# Patient Record
Sex: MALE | Race: WHITE | NOT HISPANIC OR LATINO | Employment: OTHER | URBAN - METROPOLITAN AREA
[De-identification: names, ages, dates, MRNs, and addresses within clinical notes are randomized per-mention and may not be internally consistent; named-entity substitution may affect disease eponyms.]

---

## 2019-06-18 ENCOUNTER — OFFICE VISIT (OUTPATIENT)
Dept: INTERNAL MEDICINE | Facility: CLINIC | Age: 69
End: 2019-06-18
Payer: MEDICARE

## 2019-06-18 ENCOUNTER — OFFICE VISIT (OUTPATIENT)
Dept: NEUROLOGY | Facility: CLINIC | Age: 69
End: 2019-06-18
Payer: MEDICARE

## 2019-06-18 VITALS
HEART RATE: 70 BPM | TEMPERATURE: 99 F | WEIGHT: 212.5 LBS | SYSTOLIC BLOOD PRESSURE: 130 MMHG | DIASTOLIC BLOOD PRESSURE: 86 MMHG | HEIGHT: 73 IN | OXYGEN SATURATION: 97 % | BODY MASS INDEX: 28.16 KG/M2

## 2019-06-18 VITALS
DIASTOLIC BLOOD PRESSURE: 78 MMHG | HEART RATE: 72 BPM | HEIGHT: 73 IN | SYSTOLIC BLOOD PRESSURE: 135 MMHG | WEIGHT: 215 LBS | BODY MASS INDEX: 28.49 KG/M2

## 2019-06-18 DIAGNOSIS — G62.9 PERIPHERAL POLYNEUROPATHY: Chronic | ICD-10-CM

## 2019-06-18 DIAGNOSIS — N40.0 BENIGN PROSTATIC HYPERPLASIA, UNSPECIFIED WHETHER LOWER URINARY TRACT SYMPTOMS PRESENT: ICD-10-CM

## 2019-06-18 DIAGNOSIS — Z23 NEED FOR TETANUS BOOSTER: ICD-10-CM

## 2019-06-18 DIAGNOSIS — R73.03 PREDIABETES: ICD-10-CM

## 2019-06-18 DIAGNOSIS — M79.7 FIBROMYALGIA: ICD-10-CM

## 2019-06-18 DIAGNOSIS — K21.9 GASTROESOPHAGEAL REFLUX DISEASE, ESOPHAGITIS PRESENCE NOT SPECIFIED: ICD-10-CM

## 2019-06-18 DIAGNOSIS — F32.A ANXIETY AND DEPRESSION: ICD-10-CM

## 2019-06-18 DIAGNOSIS — F03.90 DEMENTIA WITHOUT BEHAVIORAL DISTURBANCE, UNSPECIFIED DEMENTIA TYPE: ICD-10-CM

## 2019-06-18 DIAGNOSIS — F02.80 DEMENTIA IN ALZHEIMER'S DISEASE: ICD-10-CM

## 2019-06-18 DIAGNOSIS — R19.7 DIARRHEA, UNSPECIFIED TYPE: ICD-10-CM

## 2019-06-18 DIAGNOSIS — I10 ESSENTIAL HYPERTENSION: ICD-10-CM

## 2019-06-18 DIAGNOSIS — E78.5 HYPERLIPIDEMIA, UNSPECIFIED HYPERLIPIDEMIA TYPE: ICD-10-CM

## 2019-06-18 DIAGNOSIS — R41.3 MEMORY LOSS: ICD-10-CM

## 2019-06-18 DIAGNOSIS — G30.9 DEMENTIA IN ALZHEIMER'S DISEASE: ICD-10-CM

## 2019-06-18 DIAGNOSIS — R26.9 GAIT DIFFICULTY: ICD-10-CM

## 2019-06-18 DIAGNOSIS — Z95.2 H/O MITRAL VALVE REPLACEMENT WITH MECHANICAL VALVE: ICD-10-CM

## 2019-06-18 DIAGNOSIS — F41.9 ANXIETY AND DEPRESSION: ICD-10-CM

## 2019-06-18 DIAGNOSIS — E34.9 TESTOSTERONE DEFICIENCY: ICD-10-CM

## 2019-06-18 DIAGNOSIS — G62.9 NEUROPATHY: Primary | ICD-10-CM

## 2019-06-18 DIAGNOSIS — R53.81 OTHER MALAISE: ICD-10-CM

## 2019-06-18 DIAGNOSIS — R41.3 MEMORY DIFFICULTIES: ICD-10-CM

## 2019-06-18 PROCEDURE — 99204 OFFICE O/P NEW MOD 45 MIN: CPT | Mod: S$PBB,ICN,CMP, | Performed by: FAMILY MEDICINE

## 2019-06-18 PROCEDURE — 99215 OFFICE O/P EST HI 40 MIN: CPT | Mod: PBBFAC,27 | Performed by: STUDENT IN AN ORGANIZED HEALTH CARE EDUCATION/TRAINING PROGRAM

## 2019-06-18 PROCEDURE — 99999 PR PBB SHADOW E&M-EST. PATIENT-LVL V: CPT | Mod: PBBFAC,GC,, | Performed by: STUDENT IN AN ORGANIZED HEALTH CARE EDUCATION/TRAINING PROGRAM

## 2019-06-18 PROCEDURE — 99204 PR OFFICE/OUTPT VISIT, NEW, LEVL IV, 45-59 MIN: ICD-10-PCS | Mod: S$PBB,ICN,CMP, | Performed by: FAMILY MEDICINE

## 2019-06-18 PROCEDURE — 99204 OFFICE O/P NEW MOD 45 MIN: CPT | Mod: S$PBB,GC,, | Performed by: STUDENT IN AN ORGANIZED HEALTH CARE EDUCATION/TRAINING PROGRAM

## 2019-06-18 PROCEDURE — 99204 PR OFFICE/OUTPT VISIT, NEW, LEVL IV, 45-59 MIN: ICD-10-PCS | Mod: S$PBB,GC,, | Performed by: STUDENT IN AN ORGANIZED HEALTH CARE EDUCATION/TRAINING PROGRAM

## 2019-06-18 PROCEDURE — 99999 PR PBB SHADOW E&M-NEW PATIENT-LVL III: CPT | Mod: PBBFAC,,, | Performed by: FAMILY MEDICINE

## 2019-06-18 PROCEDURE — 99999 PR PBB SHADOW E&M-NEW PATIENT-LVL III: ICD-10-PCS | Mod: PBBFAC,,, | Performed by: FAMILY MEDICINE

## 2019-06-18 PROCEDURE — 99999 PR PBB SHADOW E&M-EST. PATIENT-LVL V: ICD-10-PCS | Mod: PBBFAC,GC,, | Performed by: STUDENT IN AN ORGANIZED HEALTH CARE EDUCATION/TRAINING PROGRAM

## 2019-06-18 PROCEDURE — 99203 OFFICE O/P NEW LOW 30 MIN: CPT | Mod: PBBFAC | Performed by: FAMILY MEDICINE

## 2019-06-18 RX ORDER — LEVOCETIRIZINE DIHYDROCHLORIDE 5 MG/1
1 TABLET, FILM COATED ORAL
COMMUNITY
Start: 2018-11-10

## 2019-06-18 RX ORDER — AMOXICILLIN 500 MG/1
CAPSULE ORAL
COMMUNITY
Start: 2019-05-10 | End: 2019-06-18

## 2019-06-18 RX ORDER — BUSPIRONE HYDROCHLORIDE 10 MG/1
10 TABLET ORAL 2 TIMES DAILY
Refills: 1 | COMMUNITY
Start: 2019-05-23 | End: 2019-08-21 | Stop reason: SDUPTHER

## 2019-06-18 RX ORDER — OXYCODONE AND ACETAMINOPHEN 7.5; 325 MG/1; MG/1
325 TABLET ORAL
Refills: 0 | COMMUNITY
Start: 2019-04-05 | End: 2019-06-18

## 2019-06-18 RX ORDER — WARFARIN SODIUM 5 MG/1
5 TABLET ORAL
Refills: 2 | COMMUNITY
Start: 2019-06-07 | End: 2019-10-01 | Stop reason: SDUPTHER

## 2019-06-18 RX ORDER — TIZANIDINE 4 MG/1
4 TABLET ORAL
COMMUNITY
Start: 2019-04-02

## 2019-06-18 RX ORDER — MEMANTINE HYDROCHLORIDE 10 MG/1
10 TABLET ORAL
Refills: 4 | COMMUNITY
Start: 2019-05-03 | End: 2019-09-13

## 2019-06-18 RX ORDER — CLINDAMYCIN HYDROCHLORIDE 300 MG/1
300 CAPSULE ORAL
Refills: 0 | COMMUNITY
Start: 2019-04-05 | End: 2019-06-18

## 2019-06-18 RX ORDER — OMEPRAZOLE 20 MG/1
20 CAPSULE, DELAYED RELEASE ORAL
COMMUNITY
Start: 2016-08-11

## 2019-06-18 RX ORDER — GALANTAMINE HYDROBROMIDE 8 MG/1
8 CAPSULE, EXTENDED RELEASE ORAL
Refills: 0 | COMMUNITY
Start: 2019-05-03 | End: 2019-06-18

## 2019-06-18 RX ORDER — LOSARTAN POTASSIUM AND HYDROCHLOROTHIAZIDE 25; 100 MG/1; MG/1
1 TABLET ORAL DAILY
Refills: 3 | COMMUNITY
Start: 2019-05-23 | End: 2019-08-21 | Stop reason: SDUPTHER

## 2019-06-18 RX ORDER — TRIAMCINOLONE ACETONIDE 1 MG/G
OINTMENT TOPICAL
COMMUNITY
End: 2020-08-14

## 2019-06-18 RX ORDER — EZETIMIBE 10 MG/1
10 TABLET ORAL
Refills: 3 | COMMUNITY
Start: 2019-04-01 | End: 2019-12-02 | Stop reason: SDUPTHER

## 2019-06-18 RX ORDER — GABAPENTIN 600 MG/1
600 TABLET ORAL 3 TIMES DAILY PRN
Refills: 0 | COMMUNITY
Start: 2019-03-25 | End: 2019-09-13

## 2019-06-18 RX ORDER — PREDNISONE 50 MG/1
50 TABLET ORAL DAILY
Refills: 0 | COMMUNITY
Start: 2019-04-08 | End: 2019-06-18

## 2019-06-18 RX ORDER — HYDROCODONE BITARTRATE AND ACETAMINOPHEN 7.5; 325 MG/1; MG/1
325 TABLET ORAL
Refills: 0 | COMMUNITY
Start: 2019-03-25 | End: 2019-06-18

## 2019-06-18 RX ORDER — KETOCONAZOLE 20 MG/G
CREAM TOPICAL
COMMUNITY
Start: 2016-12-08 | End: 2020-08-14

## 2019-06-18 RX ORDER — TESTOSTERONE CYPIONATE 200 MG/ML
300 INJECTION, SOLUTION INTRAMUSCULAR
COMMUNITY
Start: 2019-06-12 | End: 2019-11-27

## 2019-06-18 RX ORDER — SERTRALINE HYDROCHLORIDE 50 MG/1
75 TABLET, FILM COATED ORAL
COMMUNITY
Start: 2019-02-18 | End: 2019-07-11 | Stop reason: SDUPTHER

## 2019-06-18 RX ORDER — CARVEDILOL 12.5 MG/1
12.5 TABLET ORAL 2 TIMES DAILY WITH MEALS
Refills: 3 | COMMUNITY
Start: 2019-06-07 | End: 2019-12-02 | Stop reason: SDUPTHER

## 2019-06-18 RX ORDER — GALANTAMINE HYDROBROMIDE 16 MG/1
16 CAPSULE, EXTENDED RELEASE ORAL
Refills: 4 | COMMUNITY
Start: 2019-05-03 | End: 2019-09-13

## 2019-06-18 RX ORDER — DAPSONE 25 MG/1
25 TABLET ORAL
COMMUNITY
Start: 2018-09-27 | End: 2019-06-18

## 2019-06-18 RX ORDER — FINASTERIDE 5 MG/1
5 TABLET, FILM COATED ORAL
Refills: 1 | COMMUNITY
Start: 2019-03-31

## 2019-06-18 NOTE — PROGRESS NOTES
"Subjective:      Patient ID: Blane Kim is a 69 y.o. male.    Chief Complaint: Establish Care      HPI:  Blane Kim is a 69 year old male with anxiety/depression, atrial fibrillation, BPH, dementia, hyperlipidemia, hypertension who presents to clinic today to establish care.    Patient accompanied by his wife today.    States he is on multiple medications and feels like it's "overpowering" him.  Has concerns related to this.    Wife states patient was diagnosed with fibromyalgia about 1 year ago and is concerned if this a true diagnosis or if some of his symptoms are related to medication side effects.  Prescribed gabapentin 600 mg, states he takes 4.5 tablets per day.  States the gabapentin does not help with his pain.  Endorses pain to multiple spots over his body including his shoulders, his shoulder blades, and bilateral upper and lower extremities.  Also prescribed tizanidine 4 mg by mouth nightly as needed.    Has been diagnosed by a neurologist with the "beginning stages" of Alzheimers by a neurologist in [place] several months ago.  Denies family history of this.  Has appointment with neurologist here for 2nd opinion.  Has showed some decreased in cognitive thinking skills on evaluation in [place].  Often forgets where he puts his drinks/keys/wallet.  Has many guns in the home--located in locked gun cabinet, afraid he will forget something related to his guns or his keys.  Has had some moments when driving that he may forget directions/get lost and doesn't know where he is.  Wife does the cooking in the home.      Endorses history of chronic loose stools/diarrhea for approximately 2 years.  States this has happened recently.  Was taking Aricept at that time and has since discontinued this.  Denies associated fevers.  Denies associated melena or hematochezia.  States the last episode of diarrhea was yesterday throughout the day.  Has not tried anything.  Has Lomotil at home.  " "    Anxiety/depression:  Prescribed buspirone 10 mg by mouth twice daily and sertraline 50 mg by mouth daily.  Symptoms stable.  Endorses "bad" anxiety especially when his wife is driving or driving in the rain.  In behavioral therapy which has helped.  Prescribed by patient's psychiatrist.    BPH:  Prescribed finasteride 5 mg by mouth daily.  States he was just recently taken off of this to see how his symptoms respond.  Followed by urology.    Dementia:  Prescribed galantamine 16 mg by mouth once daily and memantine 10 mg by mouth daily.    GERD:  Prescribed omeprazole 20 mg by mouth daily.  Symptoms adequately controlled with Rx.    HLD:  Prescribed Zetia 10 mg by mouth daily.    HTN:  Prescribed losartan-HCTZ 100-25 mg by mouth daily and carvedilol 12.5 mg by mouth twice daily.  Blood pressure elevated today.  Just bought a new blood pressure monitor.  Blood pressure elevated on initial check per medical assistant today.  Wife states his blood pressure is typically high.    Mechanical mitral valve:  Prescribed warfarin 5 mg by mouth (10 mg Tues/Thurs/Sat, 7.5 mg every other day) and carvedilol 12.5 mg by mouth twice daily.  Follows with coumadin clinic in [place].    Testosterone deficiency:  Prescribed testosterone cypionate 200 mg/mL 300 mg into muscle.  Through his urologist.    Health Care Maintenance:  Last tetanus booster:  States he has had this over 10 years ago.  Pneumococcal vaccination:  States he had one of these 6-7 years ago.  Shingles vaccination:  Had Zostavax in the past, deferred today.  Last routine labs:  States he did have labs done recently through PCP in [facility]  Last colonoscopy:  States he is due for this in about 4-5 years      Past Medical History:   Diagnosis Date    Anxiety and depression     BPH (benign prostatic hyperplasia)     Dementia     GERD (gastroesophageal reflux disease)     H/O mitral valve replacement     Hyperlipidemia     Hypertension     Testosterone " deficiency        History reviewed. No pertinent surgical history.    History reviewed. No pertinent family history.    Social History     Socioeconomic History    Marital status:      Spouse name: Not on file    Number of children: Not on file    Years of education: Not on file    Highest education level: Not on file   Occupational History    Not on file   Social Needs    Financial resource strain: Not on file    Food insecurity:     Worry: Not on file     Inability: Not on file    Transportation needs:     Medical: Not on file     Non-medical: Not on file   Tobacco Use    Smoking status: Former Smoker    Smokeless tobacco: Former User   Substance and Sexual Activity    Alcohol use: Not on file    Drug use: Not on file    Sexual activity: Not on file   Lifestyle    Physical activity:     Days per week: Not on file     Minutes per session: Not on file    Stress: Not on file   Relationships    Social connections:     Talks on phone: Not on file     Gets together: Not on file     Attends Sikhism service: Not on file     Active member of club or organization: Not on file     Attends meetings of clubs or organizations: Not on file     Relationship status: Not on file   Other Topics Concern    Not on file   Social History Narrative    Not on file       Review of Systems   Constitutional: Negative for chills, fatigue and fever.   HENT: Negative for congestion, hearing loss, nosebleeds, rhinorrhea, sore throat and trouble swallowing.    Eyes: Negative for pain and visual disturbance.   Respiratory: Negative for cough, shortness of breath and wheezing.    Cardiovascular: Negative for chest pain and palpitations.   Gastrointestinal: Positive for diarrhea. Negative for abdominal distention, abdominal pain, constipation, nausea and vomiting.   Genitourinary: Negative for difficulty urinating, dysuria, frequency, hematuria and urgency.   Musculoskeletal: Positive for arthralgias and myalgias. Negative  "for back pain.   Skin: Negative for color change and rash.   Neurological: Negative for dizziness, syncope, speech difficulty, weakness, numbness and headaches.   Psychiatric/Behavioral: Negative for agitation, behavioral problems and confusion. The patient is nervous/anxious.         + Memory loss     Objective:     Vitals:    06/18/19 1102   BP: 130/86   BP Location: Right arm   Patient Position: Sitting   BP Method: Medium (Automatic)   Pulse: 70   Temp: 98.9 °F (37.2 °C)   TempSrc: Oral   SpO2: 97%   Weight: 96.4 kg (212 lb 8.4 oz)   Height: 6' 1" (1.854 m)       Physical Exam   Constitutional: He appears well-developed and well-nourished. He is cooperative. No distress.   HENT:   Head: Normocephalic and atraumatic.   Right Ear: Hearing and external ear normal.   Left Ear: Hearing and external ear normal.   Nose: Nose normal. No rhinorrhea. No epistaxis.   Mouth/Throat: Oropharynx is clear and moist and mucous membranes are normal. No oral lesions.   Eyes: Pupils are equal, round, and reactive to light. Conjunctivae, EOM and lids are normal. Right eye exhibits no discharge. Left eye exhibits no discharge.   Neck: Trachea normal and normal range of motion. Neck supple. No tracheal deviation present.   Cardiovascular: Normal rate, regular rhythm and normal heart sounds. Exam reveals no gallop and no friction rub.   No murmur heard.  Pulmonary/Chest: Effort normal and breath sounds normal. No respiratory distress. He has no wheezes. He has no rales.   Abdominal: Soft. Bowel sounds are normal. He exhibits no distension. There is no tenderness. There is no rebound and no guarding.   Musculoskeletal: Normal range of motion. He exhibits no edema or deformity.   Neurological: He is alert. No cranial nerve deficit. He exhibits normal muscle tone.   Skin: Skin is warm and dry. No rash noted.   Psychiatric: He has a normal mood and affect. His speech is normal and behavior is normal. Judgment and thought content normal. " Cognition and memory are normal.   Nursing note and vitals reviewed.     Assessment:      1. Anxiety and depression    2. Benign prostatic hyperplasia, unspecified whether lower urinary tract symptoms present    3. Memory loss    4. Dementia without behavioral disturbance, unspecified dementia type    5. Diarrhea, unspecified type    6. Fibromyalgia    7. Gastroesophageal reflux disease, esophagitis presence not specified    8. H/O mitral valve replacement with mechanical valve    9. Hyperlipidemia, unspecified hyperlipidemia type    10. Essential hypertension    11. Need for tetanus booster    12. Testosterone deficiency      Plan:   Blane was seen today for establish care.    Diagnoses and all orders for this visit:    Anxiety and depression        -     Stable; continue current regimen presently and regular follow up with psychiatry; consider Cymbalta, instructed patient to discuss this with his psychiatrist.    Benign prostatic hyperplasia, unspecified whether lower urinary tract symptoms present        -     Stable, continue current regimen and regular follow up with urology.    Memory loss; Dementia without behavioral disturbance, unspecified dementia type        -     Keep appointment with [facility] later today for 2nd opinion; consider neuropsychiatric testing; decrease gabapentin to 2-3 times daily instead of 4.5 tablets per day    Diarrhea, unspecified type        -     Recommended OTC probiotics, OTC Imodium PRN, adequate fluid intake with electrolytes; if no improvement with OTC medications can try Lomotil he has at home; return to clinic if no improvement    Fibromyalgia        -     Instructed patient to discuss Cymbalta with his psychiatrist    Gastroesophageal reflux disease, esophagitis presence not specified        -     Stable, continue current regimen.    H/O mitral valve replacement with mechanical valve        -     Continue current regimen and regular follow up with coumadin clinic in  [place]    Hyperlipidemia, unspecified hyperlipidemia type        -     Continue current regimen.    Essential hypertension        -    Stable, continue current regimen; recommended low sodium diet and weight loss    Need for tetanus booster        -     Tdap to be administered today    Testosterone deficiency        -     Continue current regimen and regular follow up with urology    To complete a release of records form to obtain copy of vaccination record, labs, and last colonoscopy report from previous PCP today.

## 2019-06-18 NOTE — PATIENT INSTRUCTIONS
1 - MRI Brain    2 - Stop Namenda today.  If you still have diarrhea in two weeks, ok to stop the Razadyne.    3 - Decrease gabapentin to twice a day from four times a day.  If no change, ok to stop gabapentin.   If you have significantly increased pain, ok to restart.    4 - STOP DRIVING    5 - Labs today    We will consider:  - Cymbalta  - Needle exam for your neuropathy  - Creams  At next visit

## 2019-06-19 ENCOUNTER — PATIENT MESSAGE (OUTPATIENT)
Dept: NEUROLOGY | Facility: CLINIC | Age: 69
End: 2019-06-19

## 2019-06-19 PROBLEM — G62.9 PERIPHERAL NEUROPATHY: Chronic | Status: ACTIVE | Noted: 2019-06-19

## 2019-06-19 NOTE — ASSESSMENT & PLAN NOTE
Unclear diagnosis, but defer further w/u / eval at this time, given precedence given to memory and neuropathy

## 2019-06-19 NOTE — ASSESSMENT & PLAN NOTE
Continue current meds at this time.  Eventually, would like to wean current meds and trial Cymbalta (or Elavil) for mood + pain, but defer for now given multiple other medication changes

## 2019-06-19 NOTE — PROGRESS NOTES
"Name: Blane Kim  MRN: [id number]   CSN: [id number]      Date: 2019    Chief Complaint: memory loss, neuropathy, 2nd opinion fibromyalgia    History of Present Illness (HPI) 19:  Mr. Kim is a 70 yo M with HTN, HLD, h/o afib s/p MAZE who presents for evaluation of his memory loss, neuropathy, and "fibromyalgia."  His wife is most concerned about his memory.  He reports having had difficulty with his memory for about a year, which he describes as losing his coffee mug and not finding it until the next day, forgetting the names of people he's been friends with for decades, and driving down the street and wondering when they did all that construction (but really, it's the same as it's always been).  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  His wife does most of the shopping, though he attempts sometimes - though it takes about two hours for him to do a 'quick' shopping .  Severe diarrhea x2 years, since starting memory meds (Aricept, now DCd); currently on Namenda + Galantamine.  Wife does not think that he has had brain imaging before.  It sounds as though he has had neuropsych testing x2, but no results are available at this visit.  Some difficulty with walking, attributed to joint pain/prior trauma (offered surgery for ACL, meniscus, but declined).    Burning pain, numbness, tingling to just proximal to knees bilaterally and BUE to level of shoulders xyears.    Pains in multiple joints.    ROS:  Positive for memory loss, joint pain, neuropathy, diarrhea.  Negative for HA, vision changes, chest pain, palpitations, cough, SOB, n/v, abdo pain.    Past Medical History: The patient  has a past medical history of Anxiety and depression, BPH (benign prostatic hyperplasia), Dementia, GERD (gastroesophageal reflux disease), H/O mitral valve " "replacement, Hyperlipidemia, Hypertension, and Testosterone deficiency.    Social History: The patient  reports that he has quit smoking. He has quit using smokeless tobacco.    Family History: Their family history is not on file.    Allergies: Statins-hmg-coa reductase inhibitors      Meds:   Current Outpatient Medications on File Prior to Visit   Medication Sig Dispense Refill    ketoconazole (NIZORAL) 2 % cream by intratympanic route.      levocetirizine (XYZAL) 5 MG tablet Take 1 tablet by mouth.      omeprazole (PRILOSEC) 20 MG capsule Take 20 mg by mouth.      sertraline (ZOLOFT) 50 MG tablet Take 75 mg by mouth.      testosterone cypionate (DEPOTESTOTERONE CYPIONATE) 200 mg/mL injection Inject 300 mg into the muscle.      tiZANidine (ZANAFLEX) 4 MG tablet Take 4 mg by mouth.      triamcinolone acetonide 0.1% (KENALOG) 0.1 % ointment by intratympanic route.       No current facility-administered medications on file prior to visit.        Exam:  /78   Pulse 72   Ht 6' 1" (1.854 m)   Wt 97.5 kg (215 lb)   BMI 28.37 kg/m²     Constitutional  Well-developed, well-nourished, appears stated age   Respiratory  Normal respiratory effort   Cardiovascular  Shiny red/purple BLE, prominent in ankles/feet. Decreased hair.   Neurological    * Mental status      - Orientation  Oriented to person, place, time, and situation     - Memory   Intact recent and remote     - Attention/concentration  Attentive, vigilant during exam, but easily distracted during conversations; tangential     - Language  Naming & repetition intact, +2-step commands     - Fund of knowledge  Aware of current events     - Executive  Poorly-organized thoughts; repetitive     - Other     * Cranial nerves       - CN II  Visual fields full to confrontation     - CN III, IV, VI  Extraocular movements full, normal pursuits and saccades     - CN V  Sensation V1 - V3 intact     - CN VII  Face strong and symmetric bilaterally     - CN VIII  " Hearing intact bilaterally     - CN IX, X  Palate raises midline and symmetric     - CN XI  SCM and trapezius 5/5 bilaterally     - CN XII  Tongue midline   * Motor  Muscle bulk normal, strength 5/5 throughout   * Sensory   Symmetrical to light touch bilaterally throughout.  Decreased temperature and vibration in glove and stocking distribution   * Coordination  No dysmetria with finger-to-nose or heel-to-shin   * Gait  Antalgic gait   * Deep tendon reflexes  2+ and symmetric throughout     20/30 MOCA on 6/18/19:          Laboratory/Radiological:  - Results:  Lab Visit on 06/18/2019   Component Date Value Ref Range Status    Vitamin B-12 06/18/2019 490  210 - 950 pg/mL Final    TSH 06/18/2019 2.506  0.400 - 4.000 uIU/mL Final    Hemoglobin A1C 06/18/2019 5.1  4.0 - 5.6 % Final    Estimated Avg Glucose 06/18/2019 100  68 - 131 mg/dL Final    RPR 06/18/2019 Non-reactive  Non-reactive Final     No cerebral imaging on file.      Problem List Items Addressed This Visit        Neuro    Peripheral neuropathy (Chronic)    Current Assessment & Plan     Severe  Labs as above, including hep C and SPEP    Consider EMG         Dementia    Overview     MOCA 20/30 on 6/18/2019  Severe diarrhea with memory medications         Current Assessment & Plan     - Labs today: vitamins B1, B2, B6, B12; TSH, A1c, RPR.  Given concomitant severe neuropathic pain, add hep C and SPEP (r/o MM)    - MRI Brain    - STOP DRIVING    - Given severe diarrhea, stop memory medications:  Stop Namenda today.  If continued severe diarrhea, stop Galantamine in 2 weeks.    If diarrhea persists, consider alternate causes    - Consider neuropsych            Psychiatric    Anxiety and depression    Current Assessment & Plan     Continue current meds at this time.  Eventually, would like to wean current meds and trial Cymbalta (or Elavil) for mood + pain, but defer for now given multiple other medication changes            Orthopedic    Fibromyalgia     Current Assessment & Plan     Unclear diagnosis, but defer further w/u / eval at this time, given precedence given to memory and neuropathy           Other Visit Diagnoses     Neuropathy    -  Primary    Relevant Orders    VITAMIN B1    VITAMIN B2    VITAMIN B6    VITAMIN B12 (Completed)    TSH (Completed)    Hemoglobin A1c (Completed)    RPR (Completed)    Ambulatory consult to Physical Therapy    HEPATITIS PANEL, ACUTE    PROTEIN ELECTROPHORESIS, SERUM    Memory difficulties        Relevant Orders    VITAMIN B1    VITAMIN B2    VITAMIN B6    VITAMIN B12 (Completed)    TSH (Completed)    Hemoglobin A1c (Completed)    RPR (Completed)    Prediabetes         Relevant Orders    Hemoglobin A1c (Completed)    Dementia in Alzheimer's disease        Relevant Orders    MRI Brain Without Contrast    Gait difficulty        Relevant Orders    Ambulatory consult to Physical Therapy    Other malaise         Relevant Orders    HEPATITIS PANEL, ACUTE        RTC 3 mo in movement clinic    Marian Gant MD  [facility]  PGY-4

## 2019-06-19 NOTE — ASSESSMENT & PLAN NOTE
- Labs today: vitamins B1, B2, B6, B12; TSH, A1c, RPR.  Given concomitant severe neuropathic pain, add hep C and SPEP (r/o MM)    - MRI Brain    - STOP DRIVING    - Given severe diarrhea, stop memory medications:  Stop Namenda today.  If continued severe diarrhea, stop Galantamine in 2 weeks.    If diarrhea persists, consider alternate causes    - Consider neuropsych

## 2019-06-20 NOTE — PROGRESS NOTES
I have seen the patient, reviewed the Resident's history and physical, assessment and plan. I have personally interviewed and examined the patient at bedside and agree with the findings.       MD Landry Ozuna - Neurology

## 2019-07-09 ENCOUNTER — HOSPITAL ENCOUNTER (OUTPATIENT)
Dept: RADIOLOGY | Facility: HOSPITAL | Age: 69
Discharge: HOME | End: 2019-07-09
Attending: STUDENT IN AN ORGANIZED HEALTH CARE EDUCATION/TRAINING PROGRAM
Payer: MEDICARE

## 2019-07-09 DIAGNOSIS — F02.80 DEMENTIA IN ALZHEIMER'S DISEASE: ICD-10-CM

## 2019-07-09 DIAGNOSIS — G30.9 DEMENTIA IN ALZHEIMER'S DISEASE: ICD-10-CM

## 2019-07-09 PROCEDURE — 70551 MRI BRAIN STEM W/O DYE: CPT | Mod: 26,,, | Performed by: RADIOLOGY

## 2019-07-09 PROCEDURE — 70551 MRI BRAIN STEM W/O DYE: CPT | Mod: TC

## 2019-07-09 PROCEDURE — 70551 MRI BRAIN WITHOUT CONTRAST: ICD-10-PCS | Mod: 26,,, | Performed by: RADIOLOGY

## 2019-07-10 ENCOUNTER — PATIENT MESSAGE (OUTPATIENT)
Dept: INTERNAL MEDICINE | Facility: CLINIC | Age: 69
End: 2019-07-10

## 2019-07-11 RX ORDER — SERTRALINE HYDROCHLORIDE 50 MG/1
75 TABLET, FILM COATED ORAL DAILY
Qty: 45 TABLET | Refills: 11 | Status: CANCELLED | OUTPATIENT
Start: 2019-07-11 | End: 2020-07-10

## 2019-07-11 RX ORDER — SERTRALINE HYDROCHLORIDE 50 MG/1
75 TABLET, FILM COATED ORAL DAILY
Qty: 45 TABLET | Refills: 11 | Status: SHIPPED | OUTPATIENT
Start: 2019-07-11 | End: 2019-09-13

## 2019-08-20 ENCOUNTER — PATIENT MESSAGE (OUTPATIENT)
Dept: INTERNAL MEDICINE | Facility: CLINIC | Age: 69
End: 2019-08-20

## 2019-08-21 RX ORDER — LOSARTAN POTASSIUM AND HYDROCHLOROTHIAZIDE 25; 100 MG/1; MG/1
1 TABLET ORAL DAILY
Qty: 90 TABLET | Refills: 3 | Status: SHIPPED | OUTPATIENT
Start: 2019-08-21 | End: 2020-01-20 | Stop reason: DRUGHIGH

## 2019-08-21 RX ORDER — BUSPIRONE HYDROCHLORIDE 10 MG/1
10 TABLET ORAL 2 TIMES DAILY
Qty: 180 TABLET | Refills: 3 | Status: SHIPPED | OUTPATIENT
Start: 2019-08-21 | End: 2020-07-16

## 2019-08-27 ENCOUNTER — OFFICE VISIT (OUTPATIENT)
Dept: INTERNAL MEDICINE | Facility: CLINIC | Age: 69
End: 2019-08-27
Payer: MEDICARE

## 2019-08-27 VITALS
BODY MASS INDEX: 27.53 KG/M2 | OXYGEN SATURATION: 98 % | TEMPERATURE: 98 F | HEIGHT: 73 IN | DIASTOLIC BLOOD PRESSURE: 86 MMHG | HEART RATE: 60 BPM | WEIGHT: 207.69 LBS | SYSTOLIC BLOOD PRESSURE: 156 MMHG

## 2019-08-27 DIAGNOSIS — I10 ESSENTIAL HYPERTENSION: ICD-10-CM

## 2019-08-27 DIAGNOSIS — K52.9 CHRONIC DIARRHEA: ICD-10-CM

## 2019-08-27 DIAGNOSIS — F03.90 DEMENTIA WITHOUT BEHAVIORAL DISTURBANCE, UNSPECIFIED DEMENTIA TYPE: ICD-10-CM

## 2019-08-27 DIAGNOSIS — B37.2 CANDIDAL INTERTRIGO: ICD-10-CM

## 2019-08-27 DIAGNOSIS — R31.9 HEMATURIA, UNSPECIFIED TYPE: ICD-10-CM

## 2019-08-27 PROCEDURE — 99999 PR PBB SHADOW E&M-EST. PATIENT-LVL V: CPT | Mod: PBBFAC,,, | Performed by: FAMILY MEDICINE

## 2019-08-27 PROCEDURE — 99213 OFFICE O/P EST LOW 20 MIN: CPT | Mod: S$PBB,,, | Performed by: FAMILY MEDICINE

## 2019-08-27 PROCEDURE — 99213 PR OFFICE/OUTPT VISIT, EST, LEVL III, 20-29 MIN: ICD-10-PCS | Mod: S$PBB,,, | Performed by: FAMILY MEDICINE

## 2019-08-27 PROCEDURE — 99215 OFFICE O/P EST HI 40 MIN: CPT | Mod: PBBFAC | Performed by: FAMILY MEDICINE

## 2019-08-27 PROCEDURE — 99999 PR PBB SHADOW E&M-EST. PATIENT-LVL V: ICD-10-PCS | Mod: PBBFAC,,, | Performed by: FAMILY MEDICINE

## 2019-08-27 RX ORDER — FLUCONAZOLE 150 MG/1
150 TABLET ORAL
Qty: 4 TABLET | Refills: 0 | Status: SHIPPED | OUTPATIENT
Start: 2019-08-27 | End: 2019-09-26

## 2019-08-27 RX ORDER — AMLODIPINE BESYLATE 5 MG/1
5 TABLET ORAL DAILY
Qty: 30 TABLET | Refills: 11 | Status: SHIPPED | OUTPATIENT
Start: 2019-08-27 | End: 2019-09-13 | Stop reason: DRUGHIGH

## 2019-08-27 NOTE — PATIENT INSTRUCTIONS
Controlling High Blood Pressure  High blood pressure (hypertension) is often called the silent killer. This is because many people who have it dont know it. High blood pressure is defined as 140/90 mm Hg or higher. Know your blood pressure and remember to check it regularly. Doing so can save your life. Here are some things you can do to help control your blood pressure.    Choose heart-healthy foods  · Select low-salt, low-fat foods. Limit sodium intake to 2,000 mg per day or the amount suggested by your healthcare provider.  · Limit canned, dried, cured, packaged, and fast foods. These can contain a lot of salt.  · Eat 8 to 10 servings of fruits and vegetables every day.  · Choose lean meats, fish, or chicken.  · Eat whole-grain pasta, brown rice, and beans.  · Eat 2 to 3 servings of low-fat or fat-free dairy products.  · Ask your doctor about the DASH eating plan. This plan helps reduce blood pressure.  · When you go to a restaurant, ask that your meal be prepared with no added salt.  Maintain a healthy weight  · Ask your healthcare provider how many calories to eat a day. Then stick to that number.  · Ask your healthcare provider what weight range is healthiest for you. If you are overweight, a weight loss of only 3% to 5% of your body weight can help lower blood pressure. Generally, a good weight loss goal is to lose 10% of your body weight in a year.  · Limit snacks and sweets.  · Get regular exercise.  Get up and get active  · Choose activities you enjoy. Find ones you can do with friends or family. This includes bicycling, dancing, walking, and jogging.  · Park farther away from building entrances.  · Use stairs instead of the elevator.  · When you can, walk or bike instead of driving.  · Reserve leaves, garden, or do household repairs.  · Be active at a moderate to vigorous level of physical activity for at least 40 minutes for a minimum of 3 to 4 days a week.   Manage stress  · Make time to relax and enjoy  life. Find time to laugh.  · Communicate your concerns with your loved ones and your healthcare provider.  · Visit with family and friends, and keep up with hobbies.  Limit alcohol and quit smoking  · Men should have no more than 2 drinks per day.  · Women should have no more than 1 drink per day.  · Talk with your healthcare provider about quitting smoking. Smoking significantly increases your risk for heart disease and stroke. Ask your healthcare provider about community smoking cessation programs and other options.  Medicines  If lifestyle changes arent enough, your healthcare provider may prescribe high blood pressure medicine. Take all medicines as prescribed. If you have any questions about your medicines, ask your healthcare provider before stopping or changing them.   Date Last Reviewed: 4/27/2016  © 7230-9535 [employer]. [address] [place] [address], [place] [address]. All rights reserved. This information is not intended as a substitute for professional medical care. Always follow your healthcare professional's instructions.

## 2019-08-27 NOTE — PROGRESS NOTES
"Subjective:      Patient ID: Blane Kim is a 69 y.o. male.    Chief Complaint: Dementia (f/u); Follow-up (2 mt ); and Hypertension (f/u)      HPI:  Blane Kim is a 69 year old male with anxiety/depression, atrial fibrillation, BPH, dementia, hyperlipidemia, hypertension who presents to clinic today for follow up on memory loss and hypertension.    HTN:  BP above goal on initial check per medical assistant today.  Prescribed losartan-HCTZ 100-25 mg by mouth daily and carvedilol 12.5 mg by mouth twice daily.    Memory loss:  Has been diagnosed by a neurologist with the "beginning stages" of Alzheimers by a neurologist in [place] several months ago.  Denies family history of this.  Has showed some decreased in cognitive thinking skills on evaluation in [place].  Often forgets where he puts his drinks/keys/wallet.  Has many guns in the home--located in locked gun cabinet, afraid he will forget something related to his guns or his keys.  Has had some moments when driving that he may forget directions/get lost and doesn't know where he is.  Wife does the cooking in the home.  Seen by neurology 6/18/19; MRI ordered at that time, recommended to stop driving, stop memory medications (Namenda) due to diarrhea with recommendations to also stop galantamine in 2 weeks if diarrhea did not improve.  To follow up with neurology 3 months out from initial visit.  States he continues to forget where he places things.  Wife has noticed more confusion in the patient when she is driving him around (asks where she is going or why she is taking certain routes).      Chronic diarrhea:  States the diarrhea has persisted despite stopping his memory medications.  Alternates between diarrhea and soft stools.  Denies presence of mucous in the diarrhea, melena, or hematochezia.  Takes Imodium which sometimes helps, other times not.    States he is allergic to Latex.  States over the past 3-4 months he has had an itchy, red rash " to the inferior aspect of his abdomen.  Has been using rubbing alcohol.  Has tried topical ketoconazole with only mild improvement.    Endorses lack of motivation in the mornings.  Would prefer to just sit and drink coffee and play on his iPad/watch TV.  Gets motivation around noon then will get up and clean up around his shop.    Endorses recurrent hematuria.  Most recently a couple of weeks ago.  Did have a cystoscope per his urologist, reports this was normal.  Endorses history of kidney stone which he has never passed.      Past Medical History:   Diagnosis Date    Anxiety and depression     BPH (benign prostatic hyperplasia)     Dementia     GERD (gastroesophageal reflux disease)     H/O mitral valve replacement     Hyperlipidemia     Hypertension     Peripheral neuropathy 6/19/2019    Testosterone deficiency        History reviewed. No pertinent surgical history.    History reviewed. No pertinent family history.    Social History     Socioeconomic History    Marital status:      Spouse name: Not on file    Number of children: Not on file    Years of education: Not on file    Highest education level: Not on file   Occupational History    Not on file   Social Needs    Financial resource strain: Not hard at all    Food insecurity:     Worry: Never true     Inability: Never true    Transportation needs:     Medical: No     Non-medical: No   Tobacco Use    Smoking status: Former Smoker    Smokeless tobacco: Former User   Substance and Sexual Activity    Alcohol use: Not on file    Drug use: Not on file    Sexual activity: Not on file   Lifestyle    Physical activity:     Days per week: 1 day     Minutes per session: 30 min    Stress: To some extent   Relationships    Social connections:     Talks on phone: More than three times a week     Gets together: Once a week     Attends Voodoo service: Not on file     Active member of club or organization: No     Attends meetings of clubs  "or organizations: Never     Relationship status:    Other Topics Concern    Not on file   Social History Narrative    Not on file       Review of Systems   Constitutional: Negative for activity change, chills, fatigue, fever and unexpected weight change.   HENT: Negative for congestion, hearing loss, nosebleeds, rhinorrhea, sore throat and trouble swallowing.    Eyes: Negative for pain, discharge and visual disturbance.   Respiratory: Negative for cough, chest tightness, shortness of breath and wheezing.    Cardiovascular: Negative for chest pain and palpitations.   Gastrointestinal: Positive for constipation and diarrhea. Negative for abdominal distention, abdominal pain, blood in stool, nausea and vomiting.   Endocrine: Negative for polydipsia and polyuria.   Genitourinary: Positive for hematuria. Negative for difficulty urinating, dysuria, frequency and urgency.   Musculoskeletal: Positive for arthralgias. Negative for back pain, joint swelling, myalgias and neck pain.   Skin: Negative for color change and rash.   Neurological: Positive for headaches. Negative for dizziness, syncope, speech difficulty, weakness and numbness.   Psychiatric/Behavioral: Positive for confusion. Negative for agitation, behavioral problems and dysphoric mood. The patient is not nervous/anxious.      Objective:     Vitals:    08/27/19 1049 08/27/19 1135   BP: (!) 156/90 (!) 156/86   BP Location: Left arm    Patient Position: Sitting    BP Method: Medium (Manual)    Pulse: 60    Temp: 97.9 °F (36.6 °C)    TempSrc: Oral    SpO2: 98%    Weight: 94.2 kg (207 lb 10.8 oz)    Height: 6' 1" (1.854 m)        Physical Exam   Constitutional: He appears well-developed and well-nourished. He is cooperative. No distress.   HENT:   Head: Normocephalic and atraumatic.   Right Ear: Hearing and external ear normal.   Left Ear: Hearing and external ear normal.   Nose: Nose normal. No rhinorrhea. No epistaxis.   Mouth/Throat: Oropharynx is clear " and moist and mucous membranes are normal. No oral lesions.   Eyes: Pupils are equal, round, and reactive to light. Conjunctivae, EOM and lids are normal. Right eye exhibits no discharge. Left eye exhibits no discharge.   Neck: Trachea normal and normal range of motion. Neck supple. No tracheal deviation present.   Cardiovascular: Normal rate, regular rhythm and normal heart sounds. Exam reveals no gallop and no friction rub.   No murmur heard.  Pulmonary/Chest: Effort normal and breath sounds normal. No respiratory distress. He has no wheezes. He has no rales.   Abdominal: Soft. Bowel sounds are normal. He exhibits no distension. There is no tenderness. There is no rebound and no guarding.   Musculoskeletal: Normal range of motion. He exhibits no edema or deformity.   Neurological: He is alert. No cranial nerve deficit. He exhibits normal muscle tone.   Skin: Skin is warm and dry. Rash noted. Rash is macular. There is erythema.   Erythematous rash to inferior aspect of bilateral abdomen and bilateral flanks.   Psychiatric: He has a normal mood and affect. His speech is normal and behavior is normal. Judgment and thought content normal. Cognition and memory are normal.   Nursing note and vitals reviewed.     Assessment:      1. Candidal intertrigo    2. Chronic diarrhea    3. Dementia without behavioral disturbance, unspecified dementia type    4. Hematuria, unspecified type    5. Essential hypertension      Plan:   Blane was seen today for dementia, follow-up and hypertension.    Diagnoses and all orders for this visit:    Candidal intertrigo  -     Start fluconazole (DIFLUCAN) 150 MG Tab; Take 1 tablet (150 mg total) by mouth every 7 days for 4 weeks.  Did not have significant improvement with topical ketoconazole.  Keep the area cool and dry.  To notify me if no improvement in 4 weeks.    Chronic diarrhea  -     Ambulatory Referral to Gastroenterology; continue Imodium PRN    Dementia without behavioral  "disturbance, unspecified dementia type        -     Continue regular follow up with neurology; consider restarting memory medications as had no significant change in his diarrhea    Hematuria, unspecified type        -      Follow up with urology; possible contribution from anticoagulation, endorses history of "embedded" kidney stone; may need follow up for nephrolithiasis    Essential hypertension  -     Above goal.  Add amLODIPine (NORVASC) 5 MG tablet; Take 1 tablet (5 mg total) by mouth once daily.  Counseled patient on potential adverse effects.  Return to clinic in 2 weeks for nursing blood pressure check.  Counseled patient on the importance of a low sodium diet and daily aerobic exercise.       "

## 2019-08-29 ENCOUNTER — TELEPHONE (OUTPATIENT)
Dept: NEUROLOGY | Facility: CLINIC | Age: 69
End: 2019-08-29

## 2019-08-29 NOTE — TELEPHONE ENCOUNTER
----- Message from Elsie Shaw sent at 8/29/2019  9:08 AM CDT -----  Contact: PTs Wife - nA  Wife is calling requesting that the appointment on 09/13 be moved to 9/11 if possible please  PT live far away and trying to make it easier on them.    Callback: [phone] or [phone]   Please leave voicemail if no one answered.

## 2019-09-13 ENCOUNTER — CLINICAL SUPPORT (OUTPATIENT)
Dept: INTERNAL MEDICINE | Facility: CLINIC | Age: 69
End: 2019-09-13
Payer: MEDICARE

## 2019-09-13 ENCOUNTER — TELEPHONE (OUTPATIENT)
Dept: INTERNAL MEDICINE | Facility: CLINIC | Age: 69
End: 2019-09-13

## 2019-09-13 ENCOUNTER — PATIENT MESSAGE (OUTPATIENT)
Dept: ADMINISTRATIVE | Facility: OTHER | Age: 69
End: 2019-09-13

## 2019-09-13 ENCOUNTER — OFFICE VISIT (OUTPATIENT)
Dept: NEUROLOGY | Facility: CLINIC | Age: 69
End: 2019-09-13
Payer: MEDICARE

## 2019-09-13 VITALS
BODY MASS INDEX: 27.55 KG/M2 | SYSTOLIC BLOOD PRESSURE: 139 MMHG | HEIGHT: 73 IN | DIASTOLIC BLOOD PRESSURE: 79 MMHG | WEIGHT: 207.88 LBS | HEART RATE: 70 BPM

## 2019-09-13 DIAGNOSIS — F32.A ANXIETY AND DEPRESSION: ICD-10-CM

## 2019-09-13 DIAGNOSIS — R41.3 MEMORY DIFFICULTY: ICD-10-CM

## 2019-09-13 DIAGNOSIS — G62.9 PERIPHERAL POLYNEUROPATHY: Primary | Chronic | ICD-10-CM

## 2019-09-13 DIAGNOSIS — F41.9 ANXIETY AND DEPRESSION: ICD-10-CM

## 2019-09-13 PROCEDURE — 99214 PR OFFICE/OUTPT VISIT, EST, LEVL IV, 30-39 MIN: ICD-10-PCS | Mod: S$PBB,,, | Performed by: PSYCHIATRY & NEUROLOGY

## 2019-09-13 PROCEDURE — 99999 PR PBB SHADOW E&M-EST. PATIENT-LVL III: ICD-10-PCS | Mod: PBBFAC,,, | Performed by: PSYCHIATRY & NEUROLOGY

## 2019-09-13 PROCEDURE — 99213 OFFICE O/P EST LOW 20 MIN: CPT | Mod: PBBFAC,27 | Performed by: PSYCHIATRY & NEUROLOGY

## 2019-09-13 PROCEDURE — 99999 PR PBB SHADOW E&M-EST. PATIENT-LVL I: ICD-10-PCS | Mod: PBBFAC,,,

## 2019-09-13 PROCEDURE — 99214 OFFICE O/P EST MOD 30 MIN: CPT | Mod: S$PBB,,, | Performed by: PSYCHIATRY & NEUROLOGY

## 2019-09-13 PROCEDURE — 99999 PR PBB SHADOW E&M-EST. PATIENT-LVL I: CPT | Mod: PBBFAC,,,

## 2019-09-13 PROCEDURE — 99999 PR PBB SHADOW E&M-EST. PATIENT-LVL III: CPT | Mod: PBBFAC,,, | Performed by: PSYCHIATRY & NEUROLOGY

## 2019-09-13 PROCEDURE — 99211 OFF/OP EST MAY X REQ PHY/QHP: CPT | Mod: PBBFAC

## 2019-09-13 RX ORDER — WARFARIN SODIUM 5 MG/1
TABLET ORAL
COMMUNITY
Start: 2019-08-29

## 2019-09-13 RX ORDER — AMLODIPINE BESYLATE 10 MG/1
10 TABLET ORAL DAILY
Qty: 90 TABLET | Refills: 3 | Status: SHIPPED | OUTPATIENT
Start: 2019-09-13 | End: 2020-01-06 | Stop reason: SDUPTHER

## 2019-09-13 RX ORDER — DULOXETIN HYDROCHLORIDE 30 MG/1
30 CAPSULE, DELAYED RELEASE ORAL DAILY
Qty: 90 CAPSULE | Refills: 3 | Status: SHIPPED | OUTPATIENT
Start: 2019-09-13 | End: 2019-10-29

## 2019-09-13 NOTE — PROGRESS NOTES
Pt is here for b/p check, 143/90, p 69. Dr Mckay notified, pt would like to start Digital HTN program. Pt was escorted to [facility] to sign up.

## 2019-09-13 NOTE — LETTER
September 13, 2019      Marian Gant MD  [address]  [facility] [address]           [facility]  [address]  [facility] [address]  Phone: [phone]  Fax: [phone]          Patient: Blane Kim   MR Number: [id number]   YOB: 1950   Date of Visit: 9/13/2019       Dear Dr. Marian Gant:    Thank you for referring Blane Kim to me for evaluation. Attached you will find relevant portions of my assessment and plan of care.    If you have questions, please do not hesitate to call me. I look forward to following Blane Kim along with you.    Sincerely,    Izaiah Ansari MD    Enclosure  CC:  No Recipients    If you would like to receive this communication electronically, please contact [email or web address] or [phone] to request more information on [employer] [facility] access.    For providers and/or their staff who would like to refer a patient to [facility], please contact us through our one-stop-shop provider referral line, [facility], at [phone].    If you feel you have received this communication in error or would no longer like to receive these types of communications, please e-mail [email or web address]

## 2019-09-13 NOTE — TELEPHONE ENCOUNTER
BP above goal.  Increase amlodipine to 10 mg by mouth daily.  Start digital HTN program.  Rx sent to patient's pharmacy electronically.

## 2019-09-13 NOTE — PROGRESS NOTES
[facility] - NEUROLOGY  [facility] [place]    Date: 2019   Patient Name: Blane Kim   MRN: [id number]   PCP: Vicente Mckay  Referring Provider: Marian Gant MD    Assessment:      This is Blane Kim, 69 y.o. male with atrial fibrillation, HTN, neuropathy potentially related to B6 deficiency with memory difficulty and MOCA 2019/30.  While MOCA is below normal, he remains fairly independent and able to perform complex task raising reservations about whether this is truly Alzheimer dementia.  He has been unable to tolerate 2 ACEI as well as namenda due to GI upset.     Plan:      -  Patient will have evaluation in memory clinic and return to me when complete  -  Start cymbalta and stop zoloft  -  B6 supplement  -  Continue Buspar, consider weaning on follow up       I discussed side effects of the medications. I asked the patient to  stop the medication if he notices serious adverse effects as we discussed and to seek immediate medical attention at an ER.     Izaiah Ansari MD  [facility]   Department of Neurology    Subjective:     HPI:   Mr. Blane Kim is a 69 y.o. 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  He remains active working in his wood shop and has been able to complete tasks such as repairing power tools.  He was recommended to stop driving on prior visit and has restricted driving to near his home without incident.  He notes some recent difficulty with memory such as not recalling knowing an acquaintance from [employer] after they passes away.    -  Diarrhea improved since coming off galantamine and namenda  -  He reports ongoing difficulty with anxiety but on questioning this seems more like situational frustration with things such as dealing with grandchildren, wife's driving, or  "figuring out how to repair tools.    -  Ongoing burning feeling in feet which is bothersome but does not interfere with activities.    Per Dr. Gant 2019:  "68 yo M with HTN, HLD, h/o afib s/p MAZE who presents for evaluation of his memory loss, neuropathy, and "fibromyalgia."  His wife is most concerned about his memory.  He reports having had difficulty with his memory for about a year, which he describes as losing his coffee mug and not finding it until the next day, forgetting the names of people he's been friends with for decades, and driving down the street and wondering when they did all that construction (but really, it's the same as it's always been).  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  His wife does most of the shopping, though he attempts sometimes - though it takes about two hours for him to do a 'quick' shopping .  Severe diarrhea x2 years, since starting memory meds (Aricept, now DCd); currently on Namenda + Galantamine.  Wife does not think that he has had brain imaging before.  It sounds as though he has had neuropsych testing x2, but no results are available at this visit.  Some difficulty with walking, attributed to joint pain/prior trauma (offered surgery for ACL, meniscus, but declined).     Burning pain, numbness, tingling to just proximal to knees bilaterally and BUE to level of shoulders xyears.     Pains in multiple joints.    Per Neurologist Dr. Nova 2018  Neurobehavioral/cognitive exam (45+ minutes): Digit span 5 numbers forward. No focal inattention on a cancellation test. Able to repeat sentences while simple commands crossed commands although he erred on syntactically complex question. On the [place] Naming Test-3 scored 15/15 correct. On fluency produced 11 exemplars for the letter S and subsequently 13 exemplars " "for the category animals. He could not get into set for motor sequencing. On bimanual crossed response testing greater than 50% errors. Triple loops were relatively accurate although there was a single error on alternating design. Ideomotor praxis 3/3 accurate gestures. On [employer] verbal learning scored 12 on free recall. I recognition he had 9 true positives correct with 1 related false positive error. Miscopied three-dimensional figure. On Sánchez visual organization 1/3 correct. 2 elements of clock drawing were accurate. Overall score the neurobehavioral exam was 17/30, frontal index = 0.41"    PAST MEDICAL HISTORY:  Past Medical History:   Diagnosis Date    Anxiety and depression     BPH (benign prostatic hyperplasia)     Dementia     GERD (gastroesophageal reflux disease)     H/O mitral valve replacement     Hyperlipidemia     Hypertension     Peripheral neuropathy 6/19/2019    Testosterone deficiency        PAST SURGICAL HISTORY:  No past surgical history on file.    CURRENT MEDS:  Current Outpatient Medications   Medication Sig Dispense Refill    amLODIPine (NORVASC) 5 MG tablet Take 1 tablet (5 mg total) by mouth once daily. 30 tablet 11    busPIRone (BUSPAR) 10 MG tablet Take 1 tablet (10 mg total) by mouth 2 (two) times daily. 180 tablet 3    carvedilol (COREG) 12.5 MG tablet Take 12.5 mg by mouth 2 (two) times daily with meals.  3    ezetimibe (ZETIA) 10 mg tablet 10 mg.  3    finasteride (PROSCAR) 5 mg tablet 5 mg.  1    fluconazole (DIFLUCAN) 150 MG Tab Take 1 tablet (150 mg total) by mouth every 7 days. 4 tablet 0    galantamine (RAZADYNE ER) 16 MG 24 hr capsule 16 mg.  4    ketoconazole (NIZORAL) 2 % cream by intratympanic route.      levocetirizine (XYZAL) 5 MG tablet Take 1 tablet by mouth.      losartan-hydrochlorothiazide 100-25 mg (HYZAAR) 100-25 mg per tablet Take 1 tablet by mouth once daily. 90 tablet 3    memantine (NAMENDA) 10 MG Tab 10 mg.  4    omeprazole (PRILOSEC) 20 " "MG capsule Take 20 mg by mouth.      sertraline (ZOLOFT) 50 MG tablet Take 1.5 tablets (75 mg total) by mouth once daily. 45 tablet 11    testosterone cypionate (DEPOTESTOTERONE CYPIONATE) 200 mg/mL injection Inject 300 mg into the muscle.      TESTOSTERONE IM Inject 300 mg into the muscle.      tiZANidine (ZANAFLEX) 4 MG tablet Take 4 mg by mouth.      triamcinolone acetonide 0.1% (KENALOG) 0.1 % ointment by intratympanic route.      warfarin (COUMADIN) 5 MG tablet 5 mg.  2    warfarin (COUMADIN) 5 MG tablet TAKE ONLY AS DIRECTED BY COUMADIN CLINIC.  TAKE 7.5MG DAILY.       No current facility-administered medications for this visit.        ALLERGIES:  Review of patient's allergies indicates:   Allergen Reactions    Statins-hmg-coa reductase inhibitors Other (See Comments)     Muscle pains/myalgias  Specifically to Lipitor.  causes joints aches    Latex Rash     Can the patient chew gum without allergic reation? Yes  Can the patient blow up a balloon without reaction? Yes  Is the patient allergic to kiwi, bananas, avocado, or chestnuts? No  Immediate itch or reaction to latex gloves? No  Known allergy to latex (i.e. blood test)? Yes  Does the patient require special undergarments, such as panties/briefs with special waist band?yes       FAMILY HISTORY:  No family history on file.    SOCIAL HISTORY:  Social History     Tobacco Use    Smoking status: Former Smoker    Smokeless tobacco: Former User   Substance Use Topics    Alcohol use: Not on file    Drug use: Not on file       Review of Systems:  12 review of systems is negative except for the symptoms mentioned in HPI.        Objective:     Vitals:    09/13/19 1311   BP: 139/79   Pulse: 70   Weight: 94.3 kg (207 lb 14.3 oz)   Height: 6' 1" (1.854 m)       General: NAD, well nourished   Eyes: no tearing, discharge, no erythema   ENT: moist mucous membranes of the oral cavity, nares patent    Neck: Supple, full range of motion  Cardiovascular: Warm and " well perfused, pulses equal and symmetrical  Lungs: Normal work of breathing, normal chest wall excursions  Skin: No rash, lesions, or breakdown on exposed skin  Psychiatry: Mood and affect are appropriate   Abdomen: soft, non tender, non distended  Extremeties: No cyanosis, clubbing or edema.    Neurological   MENTAL STATUS: Alert and oriented to person, place, and time. Speech without dysarthria, able to name and repeat without difficulty.   CRANIAL NERVES: Visual fields intact. PERRL. EOMI. Facial sensation intact. Face symmetrical. Hearing grossly intact. Full shoulder shrug bilaterally. Tongue protrudes midline   SENSORY: Sensation is intact to light touch and vibration throughout.  Negative Romberg.   MOTOR: Normal bulk and tone. No pronator drift.  5/5 deltoid, biceps, triceps, interosseous, hand  bilaterally. 5/5 iliopsoas, knee extension/flexion, foot dorsi/plantarflexion bilaterally.    REFLEXES: Symmetric and 2+ throughout. Toes down going bilaterally.   CEREBELLAR/COORDINATION/GAIT: Gait steady with normal arm swing and stride length.  Finger to nose intact. Normal rapid alternating movements.

## 2019-09-17 ENCOUNTER — PATIENT OUTREACH (OUTPATIENT)
Dept: OTHER | Facility: OTHER | Age: 69
End: 2019-09-17

## 2019-09-17 NOTE — PROGRESS NOTES
Digital Medicine Program Enrollment      Our goal is to get BP to consistently below 130/80mmHg and make the process convenient so patient can avoid extra trips to the office. Getting your blood pressure below 130/80mmHg (definition of control) will reduce your risk for heart attack, kidney failure, stroke and death (as well as kidney failure, eye disease, & dementia)      Reviewed that the Digital Medicine care team - consisting of a clinician and a health  - will follow the most current evidence-based national guidelines for treating your condition.  The health  will focus on lifestyle modifications and motivation while the clinician will focus on medication therapy.  The care team will review all data on a regular basis and reach out as needed.      Explained that one of the key parts of the program is communication with the care team.  Asked patient to respond to outreach attempts and complete questionnaires.  Stressed importance of medication adherence.      Explained that we expect patient to obtain several blood pressures per week at random times of day.  Instructed patient not to allow anyone else to use phone and monitoring device.  Confirmed appropriate BP monitoring technique.      Explained to patient that the digital medicine team is not available for emergencies.  Patient will call [facility] on-call ([phone] or [phone]) or [phone] if needed.

## 2019-09-17 NOTE — LETTER
September 17, 2019     Blane Kim  [address] [facility] [place] [address]       Dear Blane,    Welcome to [facility]! Our goal is to make care effective, proactive and convenient by using data you send us from home to better treat your chronic conditions.          My name is Kathleen Alarcon, and I am your dedicated Digital Medicine clinician. As an expert in medication management, I will help ensure that the medications you are taking continue to provide the intended benefits and help you reach your goals. You can reach me directly at [phone] or by sending me a message directly through your [facility] account.      I am Nai Luna and I will be your health . My job is to help you identify lifestyle changes to improve your disease control. We will talk about nutrition, exercise, and other ways you may be able to adjust your current habits to better your health. Additionally, we will help ensure you are completing the tests and screenings that are necessary to help manage your conditions. You can reach me directly at  or by sending me a message directly through your [facility] account.    Most importantly, YOU are at the center of this team. Together, we will work to improve your overall health and encourage you to meet your goals for a healthier lifestyle.     What we expect from YOU:  · Please take frequent home blood pressure measurements. We ask that you take at least 1 blood pressure reading per week, but more information will better help us get you know you. Be sure you rest for a few minutes before taking the reading in a quiet, comfortable place.     Be available to receive phone calls or [facility] messages, when appropriate, from your care team. Please let us know if there are any specific days or times that work best for us to reach you via phone.     Complete routine tests and screenings. Dont worry, we will help keep you on track!           What you should  expect from your Digital Medicine Care Team:   We will work with you to create a personalized plan of care and provide you with encouragement and education, including regarding lifestyle changes, that could help you manage your disease states.     We will adjust your current medications, if needed, and continue to monitor your long-term progress.     We will provide you and your physician with monthly progress reports after you have been in the program for more than 30 days.     We will send you reminders through [facility] and text messages to help ensure you do not miss any testing deadlines to help manage your disease states.    You will be able to reach us by phone or through your [facility] account by clicking our names under Care Team on the right side of the home screen.    I look forward to working with you to achieve your blood pressure goals!    We look forward to working with you to help manage your health,    Sincerely,    Your Digital Medicine Team    Please visit our websites to learn more:   · Hypertension: [email or web address]      Remember, we are not available for emergencies. If you have an emergency, please contact your doctors office directly or call [facility] on-call ([phone] or [phone]) or [phone].

## 2019-09-17 NOTE — LETTER
September 17, 2019     Blane Kim  [address] [facility] [place] [address]       Dear Blane,    Welcome to [facility]! Our goal is to make care effective, proactive and convenient by using data you send us from home to better treat your chronic conditions.          My name is Kathleen Alarcon, and I am your dedicated Digital Medicine clinician. As an expert in medication management, I will help ensure that the medications you are taking continue to provide the intended benefits and help you reach your goals. You can reach me directly at [phone] or by sending me a message directly through your [facility] account.      I am Nai Luna and I will be your health . My job is to help you identify lifestyle changes to improve your disease control. We will talk about nutrition, exercise, and other ways you may be able to adjust your current habits to better your health. Additionally, we will help ensure you are completing the tests and screenings that are necessary to help manage your conditions. You can reach me directly at  or by sending me a message directly through your [facility] account.    Most importantly, YOU are at the center of this team. Together, we will work to improve your overall health and encourage you to meet your goals for a healthier lifestyle.     What we expect from YOU:  · Please take frequent home blood pressure measurements. We ask that you take at least 1 blood pressure reading per week, but more information will better help us get you know you. Be sure you rest for a few minutes before taking the reading in a quiet, comfortable place.     Be available to receive phone calls or [facility] messages, when appropriate, from your care team. Please let us know if there are any specific days or times that work best for us to reach you via phone.     Complete routine tests and screenings. Dont worry, we will help keep you on track!           What you should  expect from your Digital Medicine Care Team:   We will work with you to create a personalized plan of care and provide you with encouragement and education, including regarding lifestyle changes, that could help you manage your disease states.     We will adjust your current medications, if needed, and continue to monitor your long-term progress.     We will provide you and your physician with monthly progress reports after you have been in the program for more than 30 days.     We will send you reminders through [facility] and text messages to help ensure you do not miss any testing deadlines to help manage your disease states.    You will be able to reach us by phone or through your [facility] account by clicking our names under Care Team on the right side of the home screen.    I look forward to working with you to achieve your blood pressure goals!    We look forward to working with you to help manage your health,    Sincerely,    Your Digital Medicine Team    Please visit our websites to learn more:   · Hypertension: [email or web address]      Remember, we are not available for emergencies. If you have an emergency, please contact your doctors office directly or call [facility] on-call ([phone] or [phone]) or [phone].

## 2019-09-23 ENCOUNTER — PATIENT MESSAGE (OUTPATIENT)
Dept: ADMINISTRATIVE | Facility: OTHER | Age: 69
End: 2019-09-23

## 2019-10-01 ENCOUNTER — PATIENT OUTREACH (OUTPATIENT)
Dept: OTHER | Facility: OTHER | Age: 69
End: 2019-10-01

## 2019-10-01 NOTE — PROGRESS NOTES
Digital Medicine: Clinician Introduction    Blane Kim is a 69 y.o. male who is newly enrolled in the Digital Medicine Clinic.    The following information was reviewed and updated:  Preferred pharmacy   [facility] [address] [place] [address] [address] [address]  [address] [address]  Phone: [phone] Fax: [phone]      Patient prefers a 90 days supply.     Review of patient's allergies indicates:   Allergen Reactions    Statins-hmg-coa reductase inhibitors Other (See Comments)     Muscle pains/myalgias  Specifically to Lipitor.  causes joints aches    Latex Rash     Can the patient chew gum without allergic reation? Yes  Can the patient blow up a balloon without reaction? Yes  Is the patient allergic to kiwi, bananas, avocado, or chestnuts? No  Immediate itch or reaction to latex gloves? No  Known allergy to latex (i.e. blood test)? Yes  Does the patient require special undergarments, such as panties/briefs with special waist band?yes       68 yo male with a PMH listed below.    Past Medical History:  No date: Anxiety and depression  No date: BPH (benign prostatic hyperplasia)  No date: Dementia  No date: GERD (gastroesophageal reflux disease)  No date: H/O mitral valve replacement  No date: Hyperlipidemia  No date: Hypertension  6/19/2019: Peripheral neuropathy  No date: Testosterone deficiency    He is having edema with amlodipine 10mg daily since 9/26.    The history is provided by the patient.     HYPERTENSION  Our goal is to get BP to consistently below 130/80mmHg and make the process convenient so patient can avoid extra trips to the office. Getting your blood pressure below 130/80mmHg (definition of control) will reduce your risk for heart attack, kidney failure, stroke and death (as well as kidney failure, eye disease, & dementia)      Reviewed non-pharmacologic therapies and impact on BP      Explained that we expect patient to obtain several blood pressures per  week at random times of day.  Instructed patient not to allow anyone else to use phone and monitoring device.  Confirmed appropriate BP monitoring technique.      Explained to patient that the digital medicine team is not available for emergencies.  Patient will call [facility] on-call ([phone] or [phone]) or 911 if needed.    Patient's BP goal is 130/80. Patients BP average is 130/72 mmHg, which is at or below goal, per 2017 ACC/AHA Hypertension Guidelines.      Med Review complete.    Allergies reviewed.      Last 5 Patient Entered Readings                                      Current 30 Day Average: 130/72     Recent Readings 9/29/2019 9/25/2019 9/23/2019 9/21/2019 9/20/2019    SBP (mmHg) 139 124 131 113 133    DBP (mmHg) 83 72 75 63 73    Pulse 72 75 76 94 74                Sleep Apnea  Patient previously diagnosed with BRAULIO He reports he is not currently using CPAP. He is not using CPAP because: no longer having trouble going to sleep. He lost twenty pounds since this diagnosis. He does not want further work-up for BRAULIO at this time..         Medication Adherence:   He misses doses: never    Patient is not selectively taking diuretics.    He does not wonder if medications are working.  He knows purpose of medications.      Adherence tools used: alarm and cell phone    Patient uses an alarm on his phone.    He takes carvedilol at 7am and 6pm.  He takes amlodipine and losartan-HCTZ in the morning.      INTERVENTION(S)  reviewed appropriate dose schedule    PLAN  patient verbalizes understanding    Recommended buying compression socks to help with edema.    Patient will continue his current HTN medications.    Recommended filling out his on-boarding questionnaires.          Topic    Lipid (Cholesterol) Test        Current Medication Regimen:  Hypertension Medications             amLODIPine (NORVASC) 10 MG tablet Take 1 tablet (10 mg total) by mouth once daily.    carvedilol (COREG) 12.5 MG tablet Take  12.5 mg by mouth 2 (two) times daily with meals.    losartan-hydrochlorothiazide 100-25 mg (HYZAAR) 100-25 mg per tablet Take 1 tablet by mouth once daily.            Reviewed the importance of self-monitoring, medication adherence, and that the health  can be used as a resource for lifestyle modifications to help reduce or maintain a healthy lifestyle.    Sent link to [facility] [employer] webpages and my contact information via [employer] for future questions. Follow up scheduled.

## 2019-10-15 ENCOUNTER — PATIENT OUTREACH (OUTPATIENT)
Dept: OTHER | Facility: OTHER | Age: 69
End: 2019-10-15

## 2019-10-29 ENCOUNTER — TELEPHONE (OUTPATIENT)
Dept: NEUROLOGY | Facility: CLINIC | Age: 69
End: 2019-10-29

## 2019-10-29 RX ORDER — DULOXETIN HYDROCHLORIDE 60 MG/1
60 CAPSULE, DELAYED RELEASE ORAL DAILY
Qty: 90 CAPSULE | Refills: 3 | Status: SHIPPED | OUTPATIENT
Start: 2019-10-29 | End: 2020-12-24 | Stop reason: SDUPTHER

## 2019-10-29 NOTE — TELEPHONE ENCOUNTER
----- Message from Izaiah Ansari MD sent at 10/29/2019  3:07 PM CDT -----  Contact: Blane   Please tell him to increase Cymbalta to 60mg daily, revised prescription has been sent to his pharmacy.    ----- Message -----  From: Adelaide Stafford MA  Sent: 10/29/2019   2:53 PM CDT  To: Izaiah Ansari MD    Patient  Stated that he wants to  Make  Sure he is not over reacting stated that he have been on the medication for over 30 days and does not seem to help would you like him to come in for a visit or try another medication  ----- Message -----  From: Wendy Franco  Sent: 10/29/2019   1:38 PM CDT  To: [facility] Staff    Pt calling to speak to someone because he says when he wakes up from a sleep he can hardly get to moving.  He says he has pain in legs, back and shoulders and he would like to know what can be done.  He has been on the Cymbalta for over 30 days now and it does not appear to be helping.  He can be reached at [phone]

## 2019-11-05 NOTE — PROGRESS NOTES
Digital Medicine: Health  Follow-Up    Patient states that he has been itching and experiencing a rash. This breakout occurs around his waistline, knees, arms, forearms, and back. This is disturbing to his daily life activities and when he is trying to sleep. Patient states that he has seen the doctor. He thought it was latex but patient is still experiencing the itch. Suggested patient consider going to the doctor to see if it is a medication or food causing the breakout.      The history is provided by the patient.     Follow Up  Follow-up reason(s): reading review      Routine Education Topics: eating patterns, physical activity and alcohol        INTERVENTION(S)  encouragement/support    PLAN  Clinician follow-up          Topic    Lipid (Cholesterol) Test        Last 5 Patient Entered Readings                                      Current 30 Day Average: 126/77     Recent Readings 11/1/2019 11/1/2019 10/29/2019 10/26/2019 10/18/2019    SBP (mmHg) 136 143 127 118 125    DBP (mmHg) 84 88 79 77 71    Pulse 72 74 73 76 73                      Diet Screening   Breakfast is typically between. [employer] Cereal with a 12 oz cup of cofee .  Lunch is typically between. Usually a sandwich but not always hungry. .    Snacks are typically. Peanut Butter and Wheat Thins .    Patient reports eating or drinking the following: water, caffeine, deli meat and Sweet tea in the evenings      Patient states that he has been watching his sodium intake. He states that he is not always hungry so does not eat a lot.        Assigning the following patient goals: maintain low sodium diet    Physical Activity Screening   When asked if exercising, patient responded: yes  Patient has the following chronic pain: neuropathy    He exercises for 30 minutes per day 3 day(s) a week.  His level of intensity when exercising is moderate.    Patient participates in the following activities: yard/housework, walking and swimming/water  aerobics    He identified the following barriers to physical activity: pain/injury/recent surgery    Patient states that he has been having pain with his neuropathy. Patient was walking in the pool because it was less pressure on his body and joints. Patient starts that he has not been able to do that with the weather being cooler. Patient and his wife started to walk around the mall for 30 minutes at least 3 times a week.     Intervention(s): goal tracking     Assigning the following patient goal(s): 150 minutes of exercise per week    Tobacco and Alcohol Screening       Patient is not eligible for referral to smoking cessation.      Alcohol urge triggers: drinks alone    Patient states that he sometimes has 2-3 beers a night. Patient states that he has been reducing his beer intake due to frequent urination during the night. Patient states that lately he has been waking up every 2 hours to go to the bathroom. Patient states that the beer or sweet tea may be the cause of it. He would like to see his Urologist.       NEDRA

## 2019-11-15 ENCOUNTER — PATIENT MESSAGE (OUTPATIENT)
Dept: ADMINISTRATIVE | Facility: OTHER | Age: 69
End: 2019-11-15

## 2019-12-02 RX ORDER — CARVEDILOL 12.5 MG/1
12.5 TABLET ORAL 2 TIMES DAILY WITH MEALS
Qty: 180 TABLET | Refills: 3 | Status: SHIPPED | OUTPATIENT
Start: 2019-12-02 | End: 2020-12-31 | Stop reason: SDUPTHER

## 2019-12-02 RX ORDER — EZETIMIBE 10 MG/1
10 TABLET ORAL DAILY
Qty: 90 TABLET | Refills: 3 | Status: SHIPPED | OUTPATIENT
Start: 2019-12-02 | End: 2020-12-31 | Stop reason: SDUPTHER

## 2019-12-03 ENCOUNTER — PATIENT OUTREACH (OUTPATIENT)
Dept: OTHER | Facility: OTHER | Age: 69
End: 2019-12-03

## 2019-12-06 ENCOUNTER — PATIENT OUTREACH (OUTPATIENT)
Dept: OTHER | Facility: OTHER | Age: 69
End: 2019-12-06

## 2019-12-06 NOTE — PROGRESS NOTES
Digital Medicine: Clinician Follow-Up    Called patient to follow-up on his at goal readings.    The history is provided by the patient.     Follow Up  Follow-up reason(s): reading review    Patient remains at goal.     Patient denies hypotension symptoms.       INTERVENTION(S)  reviewed appropriate dose schedule    PLAN  patient verbalizes understanding    Patient will maintain his current HTN medication changes.     Patient will contact me with any hypotension symptoms prior to my next outreach.           Topic    Lipid (Cholesterol) Test        Last 5 Patient Entered Readings                                      Current 30 Day Average: 119/71     Recent Readings 12/5/2019 11/27/2019 11/24/2019 11/20/2019 11/15/2019    SBP (mmHg) 126 121 119 112 119    DBP (mmHg) 74 72 72 68 68    Pulse 77 76 85 78 75             Hypertension Medications             amLODIPine (NORVASC) 10 MG tablet Take 1 tablet (10 mg total) by mouth once daily.    carvedilol (COREG) 12.5 MG tablet Take 1 tablet (12.5 mg total) by mouth 2 (two) times daily with meals.    losartan-hydrochlorothiazide 100-25 mg (HYZAAR) 100-25 mg per tablet Take 1 tablet by mouth once daily.                 Screenings

## 2019-12-17 ENCOUNTER — PATIENT MESSAGE (OUTPATIENT)
Dept: ADMINISTRATIVE | Facility: OTHER | Age: 69
End: 2019-12-17

## 2020-01-06 ENCOUNTER — PATIENT OUTREACH (OUTPATIENT)
Dept: OTHER | Facility: OTHER | Age: 70
End: 2020-01-06

## 2020-01-06 DIAGNOSIS — I10 ESSENTIAL HYPERTENSION: Primary | ICD-10-CM

## 2020-01-06 RX ORDER — AMLODIPINE BESYLATE 10 MG/1
5 TABLET ORAL DAILY
Qty: 90 TABLET | Refills: 3
Start: 2020-01-06 | End: 2020-02-28 | Stop reason: DRUGHIGH

## 2020-01-06 NOTE — PROGRESS NOTES
Digital Medicine: Clinician Follow-Up    Called patient to discuss his high blood pressure.    The history is provided by the patient.     Follow Up  Follow-up reason(s): reading review and medication change      Readings are trending up Patient is feeling poorly with a head cold. He is taking amoxicillin for a sore throat. He took Sudafed 1/1-1/3. He has a clear runny nose at this time.    Patient has been off amlodipine for the past two weeks. This adjustment was made by his local MD due to edema despite wearing compression socks. This is the cause for his elevated blood pressure.      INTERVENTION(S)  reviewed appropriate dose schedule and recommended med change    PLAN  patient verbalizes understanding and patient amenable to changes    Patient will resume amlodipine at 5mg daily. He will resume wearing his compression socks daily. He will continue losartan-HCTZ and carvedilol.     Patient will contact me if you have edema despite wearing the compression socks.          Topic    Lipid (Cholesterol) Test        Last 5 Patient Entered Readings                                      Current 30 Day Average: 143/82     Recent Readings 1/5/2020 1/5/2020 12/26/2019 12/19/2019 12/12/2019    SBP (mmHg) 174 171 139 136 123    DBP (mmHg) 93 91 79 85 62    Pulse 65 66 79 76 73             Hypertension Medications             amLODIPine (NORVASC) 10 MG tablet Take 1 tablet (10 mg total) by mouth once daily.    carvedilol (COREG) 12.5 MG tablet Take 1 tablet (12.5 mg total) by mouth 2 (two) times daily with meals.    losartan-hydrochlorothiazide 100-25 mg (HYZAAR) 100-25 mg per tablet Take 1 tablet by mouth once daily.                             Medication Adherence Screening   He misses doses: more than once a week

## 2020-01-07 ENCOUNTER — TELEPHONE (OUTPATIENT)
Dept: NEUROLOGY | Facility: CLINIC | Age: 70
End: 2020-01-07

## 2020-01-10 NOTE — PROGRESS NOTES
Digital Medicine: Health  Follow-Up    Patient reports being sick and not feeling well. Patient states that he still occasionally has trouble sleeping. He reports not always being able to relax. Patient has an appointment with Dr. Zhang next week.           The history is provided by the patient.     Follow Up  Follow-up reason(s): reading review      Readings are trending up   Routine Education Topics: eating patterns and physical activity        INTERVENTION(S)  recommended diet modifications, recommend physical activity, encouragement/support and goal setting    PLAN  patient verbalizes understanding, demonstrates understanding via teach back and patient amenable to changes          Topic    Lipid (Cholesterol) Test        Last 5 Patient Entered Readings                                      Current 30 Day Average: 143/82     Recent Readings 1/5/2020 1/5/2020 12/26/2019 12/19/2019 12/12/2019    SBP (mmHg) 174 171 139 136 123    DBP (mmHg) 93 91 79 85 62    Pulse 65 66 79 76 73                      Diet Screening   No change to diet.  Patient reports eating or drinking the following: water and fresh vegetablesHe cooks for self and utilizes a .    Patient does the shopping for groceries.  He gets groceries from the grocery store.      Barriers to a Healthy Diet: no barriers to healthy eating    Patient states that he continues being mindful of low sodium diet and staying hydrated.    Assigning the following patient goals: maintain low sodium diet    Physical Activity Screening   When asked if exercising, patient responded: yes  Patient has the following chronic pain: neuropathyHis level of intensity when exercising is low.    Patient participates in the following activities: yard/housework    He identified the following barriers to physical activity: pain/injury/recent surgery    Patient currently was sedentary watching [employer] Movies for the past 3 hours. Patient states that when he watches  "movies he will do leg lifts and stretches. Patient states " I've never been one for exercise". Encouraged patient to incorporate stretching or brisk walking for at least 30 minutes a day. Patient states that he will set a goal to stay more active.    Assigning the following patient goal(s): increase physical activity      SDOH  "

## 2020-01-12 ENCOUNTER — PATIENT OUTREACH (OUTPATIENT)
Dept: ADMINISTRATIVE | Facility: OTHER | Age: 70
End: 2020-01-12

## 2020-01-14 ENCOUNTER — OFFICE VISIT (OUTPATIENT)
Dept: INTERNAL MEDICINE | Facility: CLINIC | Age: 70
End: 2020-01-14
Payer: MEDICARE

## 2020-01-14 ENCOUNTER — OFFICE VISIT (OUTPATIENT)
Dept: NEUROLOGY | Facility: CLINIC | Age: 70
End: 2020-01-14
Payer: MEDICARE

## 2020-01-14 VITALS
HEART RATE: 76 BPM | OXYGEN SATURATION: 97 % | DIASTOLIC BLOOD PRESSURE: 88 MMHG | BODY MASS INDEX: 27.35 KG/M2 | TEMPERATURE: 99 F | WEIGHT: 206.38 LBS | HEIGHT: 73 IN | SYSTOLIC BLOOD PRESSURE: 120 MMHG

## 2020-01-14 VITALS
HEIGHT: 73 IN | WEIGHT: 207 LBS | SYSTOLIC BLOOD PRESSURE: 145 MMHG | HEART RATE: 76 BPM | DIASTOLIC BLOOD PRESSURE: 89 MMHG | BODY MASS INDEX: 27.43 KG/M2

## 2020-01-14 DIAGNOSIS — R07.9 CHEST PAIN, UNSPECIFIED TYPE: ICD-10-CM

## 2020-01-14 DIAGNOSIS — R06.89 DIFFICULTY BREATHING: ICD-10-CM

## 2020-01-14 DIAGNOSIS — R26.89 BALANCE DISORDER: Primary | ICD-10-CM

## 2020-01-14 DIAGNOSIS — I10 ESSENTIAL HYPERTENSION: Primary | ICD-10-CM

## 2020-01-14 DIAGNOSIS — R41.3 MEMORY DIFFICULTY: ICD-10-CM

## 2020-01-14 DIAGNOSIS — G62.9 PERIPHERAL POLYNEUROPATHY: Chronic | ICD-10-CM

## 2020-01-14 PROCEDURE — 99999 PR PBB SHADOW E&M-EST. PATIENT-LVL IV: ICD-10-PCS | Mod: PBBFAC,,, | Performed by: PSYCHIATRY & NEUROLOGY

## 2020-01-14 PROCEDURE — 99999 PR PBB SHADOW E&M-EST. PATIENT-LVL IV: ICD-10-PCS | Mod: PBBFAC,,, | Performed by: FAMILY MEDICINE

## 2020-01-14 PROCEDURE — 1126F PR PAIN SEVERITY QUANTIFIED, NO PAIN PRESENT: ICD-10-PCS | Mod: ,,, | Performed by: FAMILY MEDICINE

## 2020-01-14 PROCEDURE — 1159F MED LIST DOCD IN RCRD: CPT | Mod: ,,, | Performed by: FAMILY MEDICINE

## 2020-01-14 PROCEDURE — 1159F PR MEDICATION LIST DOCUMENTED IN MEDICAL RECORD: ICD-10-PCS | Mod: ,,, | Performed by: FAMILY MEDICINE

## 2020-01-14 PROCEDURE — 1159F MED LIST DOCD IN RCRD: CPT | Mod: ,,, | Performed by: PSYCHIATRY & NEUROLOGY

## 2020-01-14 PROCEDURE — 99214 OFFICE O/P EST MOD 30 MIN: CPT | Mod: PBBFAC | Performed by: PSYCHIATRY & NEUROLOGY

## 2020-01-14 PROCEDURE — 1125F AMNT PAIN NOTED PAIN PRSNT: CPT | Mod: ,,, | Performed by: PSYCHIATRY & NEUROLOGY

## 2020-01-14 PROCEDURE — 99214 OFFICE O/P EST MOD 30 MIN: CPT | Mod: S$PBB,,, | Performed by: PSYCHIATRY & NEUROLOGY

## 2020-01-14 PROCEDURE — 1125F PR PAIN SEVERITY QUANTIFIED, PAIN PRESENT: ICD-10-PCS | Mod: ,,, | Performed by: PSYCHIATRY & NEUROLOGY

## 2020-01-14 PROCEDURE — 1159F PR MEDICATION LIST DOCUMENTED IN MEDICAL RECORD: ICD-10-PCS | Mod: ,,, | Performed by: PSYCHIATRY & NEUROLOGY

## 2020-01-14 PROCEDURE — 99999 PR PBB SHADOW E&M-EST. PATIENT-LVL IV: CPT | Mod: PBBFAC,,, | Performed by: FAMILY MEDICINE

## 2020-01-14 PROCEDURE — 99214 PR OFFICE/OUTPT VISIT, EST, LEVL IV, 30-39 MIN: ICD-10-PCS | Mod: S$PBB,,, | Performed by: PSYCHIATRY & NEUROLOGY

## 2020-01-14 PROCEDURE — 99213 PR OFFICE/OUTPT VISIT, EST, LEVL III, 20-29 MIN: ICD-10-PCS | Mod: S$PBB,,, | Performed by: FAMILY MEDICINE

## 2020-01-14 PROCEDURE — 99999 PR PBB SHADOW E&M-EST. PATIENT-LVL IV: CPT | Mod: PBBFAC,,, | Performed by: PSYCHIATRY & NEUROLOGY

## 2020-01-14 PROCEDURE — 99214 OFFICE O/P EST MOD 30 MIN: CPT | Mod: PBBFAC,27 | Performed by: FAMILY MEDICINE

## 2020-01-14 PROCEDURE — 99213 OFFICE O/P EST LOW 20 MIN: CPT | Mod: S$PBB,,, | Performed by: FAMILY MEDICINE

## 2020-01-14 PROCEDURE — 1126F AMNT PAIN NOTED NONE PRSNT: CPT | Mod: ,,, | Performed by: FAMILY MEDICINE

## 2020-01-14 NOTE — PATIENT INSTRUCTIONS
DECREASE BUSPAR TO 1 TAB DAILY FOR 1 MONTH THEN STOP    CONSIDER HOME REMEDIES FOR CRAMPS SUCH AS PICKLE JUICE, APPLE CIDER VINEGAR, TONIC WATER WITH QUININE, LOW DOSE BENADRYL, OR SUPPLEMENTATION WITH MAGNESIUM 400MG DAILY    CALL IF YOU ARE NOT CONTACTED TO SCHEDULE APPOINTMENT WITH [facility]    ESTABLISH PHYSICAL THERAPY FOR BALANCE NEAR HOME

## 2020-01-14 NOTE — PROGRESS NOTES
Subjective:      Patient ID: Blane Kim is a 69 y.o. male.    Chief Complaint: Hypertension (stopped Norvasc possible cause of swelling) and Leg Swelling (both [id number] )      HPI:  Blane Kim is a 69 year old male with anxiety/depression, atrial fibrillation, BPH, dementia, hyperlipidemia, hypertension who presents to clinic today for follow up on hypertension.    HTN:  Prescribed carvedilol 12.5 mg by mouth twice daily, losartan-HCTZ 100-25 mg by mouth daily.  States he developed lower extremity edema on amlodipine 10 mg previously and a doctor in [place] recommended he discontinue this.  Discontinued amlodipine with improvement but then was called by the digital hypertension program and told to restart amlodipine due to uncontrolled hypertension.  Was recommended to resume amlodipine 5 mg by mouth daily and use compression socks daily.  Has resumed amlodipine 10 mg 1/2 tablet by mouth daily.  Denies recurrence of edema.      Complains of labored breathing intermittently for the past year or so.  Can occur at rest or with exertion, more noticeable with exertion.  Will notice himself having to breath harder.  States he had some chest pain the other day, felt like a heaviness over the central chest and neck; did have more difficulty breathing at that time as well.    Sees Dr. Donovan in [place], cardiology.      Past Medical History:   Diagnosis Date    Anxiety and depression     BPH (benign prostatic hyperplasia)     Dementia     GERD (gastroesophageal reflux disease)     H/O mitral valve replacement     Hyperlipidemia     Hypertension     Peripheral neuropathy 6/19/2019    Testosterone deficiency        History reviewed. No pertinent surgical history.    History reviewed. No pertinent family history.    Social History     Socioeconomic History    Marital status:      Spouse name: Not on file    Number of children: Not on file    Years of education: Not on file    Highest  education level: Not on file   Occupational History    Not on file   Social Needs    Financial resource strain: Not hard at all    Food insecurity:     Worry: Never true     Inability: Never true    Transportation needs:     Medical: No     Non-medical: No   Tobacco Use    Smoking status: Former Smoker    Smokeless tobacco: Former User   Substance and Sexual Activity    Alcohol use: Not on file    Drug use: Not on file    Sexual activity: Not on file   Lifestyle    Physical activity:     Days per week: 1 day     Minutes per session: 30 min    Stress: To some extent   Relationships    Social connections:     Talks on phone: More than three times a week     Gets together: Once a week     Attends Jainism service: More than 4 times per year     Active member of club or organization: No     Attends meetings of clubs or organizations: Never     Relationship status:    Other Topics Concern    Not on file   Social History Narrative    Not on file       Review of Systems   Constitutional: Negative for chills, fatigue and fever.   HENT: Negative for congestion, hearing loss, nosebleeds, rhinorrhea, sore throat and trouble swallowing.    Eyes: Negative for pain and visual disturbance.   Respiratory: Negative for cough, shortness of breath and wheezing.         + Dyspnea on exertion   Cardiovascular: Negative for chest pain and palpitations.        + History of chest pain   Gastrointestinal: Negative for abdominal distention, abdominal pain, constipation, diarrhea, nausea and vomiting.   Genitourinary: Negative for difficulty urinating, dysuria, frequency, hematuria and urgency.   Musculoskeletal: Negative for arthralgias, back pain and myalgias.   Skin: Negative for color change and rash.   Neurological: Negative for dizziness, syncope, speech difficulty, weakness, numbness and headaches.   Psychiatric/Behavioral: Negative for agitation, behavioral problems and confusion. The patient is not  "nervous/anxious.      Objective:     Vitals:    01/14/20 1110   BP: 120/88   BP Location: Right arm   Patient Position: Sitting   BP Method: Medium (Manual)   Pulse: 76   Temp: 98.6 °F (37 °C)   TempSrc: Oral   SpO2: 97%   Weight: 93.6 kg (206 lb 5.6 oz)   Height: 6' 1" (1.854 m)       Physical Exam   Constitutional: He appears well-developed and well-nourished. He is cooperative. No distress.   HENT:   Head: Normocephalic and atraumatic.   Right Ear: Hearing and external ear normal.   Left Ear: Hearing and external ear normal.   Nose: Nose normal. No rhinorrhea. No epistaxis.   Mouth/Throat: Oropharynx is clear and moist and mucous membranes are normal. No oral lesions.   Eyes: Pupils are equal, round, and reactive to light. Conjunctivae and lids are normal. Right eye exhibits no discharge. Left eye exhibits no discharge.   Neck: Trachea normal. Neck supple. No tracheal deviation present.   Cardiovascular: Normal rate, regular rhythm and normal heart sounds. Exam reveals no gallop and no friction rub.   No murmur heard.  Pulmonary/Chest: Effort normal and breath sounds normal. No respiratory distress. He has no wheezes. He has no rales.   Abdominal: Soft. Bowel sounds are normal. He exhibits no distension. There is no tenderness. There is no rebound and no guarding.   Musculoskeletal: He exhibits no edema or deformity.   Neurological: He is alert. He exhibits normal muscle tone.   Skin: Skin is warm and dry. No rash noted.   Psychiatric: He has a normal mood and affect. His speech is normal and behavior is normal. Judgment and thought content normal. Cognition and memory are normal.   Nursing note and vitals reviewed.     Assessment:      1. Essential hypertension    2. Chest pain, unspecified type    3. Difficulty breathing      Plan:   Blane was seen today for hypertension and leg swelling.    Diagnoses and all orders for this visit:    Essential hypertension        -     Stable, continue current regimen at " present.  Continue OTC compression stockings PRN.  Counseled patient on the importance of a low sodium diet and daily aerobic exercise.    Chest pain, unspecified type  -     Stress Echo Which stress agent will be used? Pharmacological; Color Flow Doppler? No; Future; recommended patient follow up with his cardiologist for further evaluation and management; recommended immediate medical evaluation for the development of any severe/sustained chest pain/SOB/palpitations    Difficulty breathing  -     Stress Echo Which stress agent will be used? Pharmacological; Color Flow Doppler? No; Future

## 2020-01-14 NOTE — PROGRESS NOTES
[facility] - NEUROLOGY  [facility] [place]    Date: 2020   Patient Name: Blane Kim   MRN: [id number]   PCP: Vicente Mckay  Referring Provider: Self, Aaareferral    Assessment:      This is Blane Kim, 69 y.o. male with atrial fibrillation, HTN, ongoing neuropathic pain despite compliance with B6 supplement with memory difficulty and MOCA 2019 20/30.  While MOCA is below normal, he remains fairly independent and able to perform complex tasks raising reservations about whether this is truly Alzheimer dementia.  He has been unable to tolerate 2 ACEI as well as namenda due to GI upset.  Mobility decline potentially related to deconditioning in setting of pain from orthopedic issues.     Plan:      -  Referral to memory clinic  -  Referral for physical therapy  -  Expanded neuropathy labs  -  Continue cymbalta and wean buspar  -  B6 supplement    Follow up 3 months     I discussed side effects of the medications. I asked the patient to stop the medication if he notices serious adverse effects as we discussed and to seek immediate medical attention at an ER.     Izaiah Ansari MD  [facility]   Department of Neurology    Subjective:     -  Occasional difficulty getting his bearings while driving but overall memory and cognitive function remain stable since last visit  -  Concerns over decline in mobility over the past 1-2 years, endorses loss of mm mass and reports he is unable to walk a mile and was not able to do so a year ago, difficulties are worse after prolonged sitting  -  No improvement in foot pain with cymbalta, remote trial of GBP unhelpful  -  Some LE edema since starting norvasc    HPI 2019:   Mr. Blane Kim is a 69 y.o. 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  He remains active working in  "his wood shop and has been able to complete tasks such as repairing power tools.  He was recommended to stop driving on prior visit and has restricted driving to near his home without incident.  He notes some recent difficulty with memory such as not recalling knowing an acquaintance from [employer] after they passes away.    -  Diarrhea improved since coming off galantamine and namenda  -  He reports ongoing difficulty with anxiety but on questioning this seems more like situational frustration with things such as dealing with grandchildren, wife's driving, or figuring out how to repair tools.    -  Ongoing burning feeling in feet which is bothersome but does not interfere with activities.    Per Dr. Gant 2019:  "70 yo M with HTN, HLD, h/o afib s/p MAZE who presents for evaluation of his memory loss, neuropathy, and "fibromyalgia."  His wife is most concerned about his memory.  He reports having had difficulty with his memory for about a year, which he describes as losing his coffee mug and not finding it until the next day, forgetting the names of people he's been friends with for decades, and driving down the street and wondering when they did all that construction (but really, it's the same as it's always been).  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  His wife does most of the shopping, though he attempts sometimes - though it takes about two hours for him to do a 'quick' shopping .  Severe diarrhea x2 years, since starting memory meds (Aricept, now DCd); currently on Namenda + Galantamine.  Wife does not think that he has had brain imaging before.  It sounds as though he has had neuropsych testing x2, but no results are available at this visit.  Some difficulty with walking, attributed to joint pain/prior trauma (offered surgery for ACL, " "meniscus, but declined).     Burning pain, numbness, tingling to just proximal to knees bilaterally and BUE to level of shoulders xyears.     Pains in multiple joints.    Per Neurologist Dr. Nova 2018  Neurobehavioral/cognitive exam (45+ minutes): Digit span 5 numbers forward. No focal inattention on a cancellation test. Able to repeat sentences while simple commands crossed commands although he erred on syntactically complex question. On the [place] Naming Test-3 scored 15/15 correct. On fluency produced 11 exemplars for the letter S and subsequently 13 exemplars for the category animals. He could not get into set for motor sequencing. On bimanual crossed response testing greater than 50% errors. Triple loops were relatively accurate although there was a single error on alternating design. Ideomotor praxis 3/3 accurate gestures. On H[employer] verbal learning scored 12 on free recall. I recognition he had 9 true positives correct with 1 related false positive error. Miscopied three-dimensional figure. On Sánchez visual organization 1/3 correct. 2 elements of clock drawing were accurate. Overall score the neurobehavioral exam was 17/30, frontal index = 0.41"    PAST MEDICAL HISTORY:  Past Medical History:   Diagnosis Date    Anxiety and depression     BPH (benign prostatic hyperplasia)     Dementia     GERD (gastroesophageal reflux disease)     H/O mitral valve replacement     Hyperlipidemia     Hypertension     Peripheral neuropathy 6/19/2019    Testosterone deficiency        PAST SURGICAL HISTORY:  No past surgical history on file.    CURRENT MEDS:  Current Outpatient Medications   Medication Sig Dispense Refill    amLODIPine (NORVASC) 10 MG tablet Take 0.5 tablets (5 mg total) by mouth once daily. 90 tablet 3    busPIRone (BUSPAR) 10 MG tablet Take 1 tablet (10 mg total) by mouth 2 (two) times daily. 180 tablet 3    carvedilol (COREG) 12.5 MG tablet Take 1 tablet (12.5 mg total) by mouth 2 (two) times " daily with meals. 180 tablet 3    DULoxetine (CYMBALTA) 60 MG capsule Take 1 capsule (60 mg total) by mouth once daily. 90 capsule 3    ezetimibe (ZETIA) 10 mg tablet Take 1 tablet (10 mg total) by mouth once daily. 90 tablet 3    finasteride (PROSCAR) 5 mg tablet 5 mg.  1    ketoconazole (NIZORAL) 2 % cream by intratympanic route.      levocetirizine (XYZAL) 5 MG tablet Take 1 tablet by mouth.      losartan-hydrochlorothiazide 100-25 mg (HYZAAR) 100-25 mg per tablet Take 1 tablet by mouth once daily. 90 tablet 3    omeprazole (PRILOSEC) 20 MG capsule Take 20 mg by mouth.      tiZANidine (ZANAFLEX) 4 MG tablet Take 4 mg by mouth.      triamcinolone acetonide 0.1% (KENALOG) 0.1 % ointment by intratympanic route.      warfarin (COUMADIN) 5 MG tablet TAKE ONLY AS DIRECTED BY COUMADIN CLINIC.  TAKE 7.5MG DAILY.      testosterone cypionate (DEPOTESTOTERONE CYPIONATE) 200 mg/mL injection Inject 300 mg into the muscle.       No current facility-administered medications for this visit.        ALLERGIES:  Review of patient's allergies indicates:   Allergen Reactions    Statins-hmg-coa reductase inhibitors Other (See Comments)     Muscle pains/myalgias  Specifically to Lipitor.  causes joints aches    Latex Rash     Can the patient chew gum without allergic reation? Yes  Can the patient blow up a balloon without reaction? Yes  Is the patient allergic to kiwi, bananas, avocado, or chestnuts? No  Immediate itch or reaction to latex gloves? No  Known allergy to latex (i.e. blood test)? Yes  Does the patient require special undergarments, such as panties/briefs with special waist band?yes       FAMILY HISTORY:  No family history on file.    SOCIAL HISTORY:  Social History     Tobacco Use    Smoking status: Former Smoker    Smokeless tobacco: Former User   Substance Use Topics    Alcohol use: Not on file    Drug use: Not on file       Review of Systems:  12 review of systems is negative except for the symptoms  "mentioned in HPI.        Objective:     Vitals:    01/14/20 0915   BP: (!) 145/89   Pulse: 76   Weight: 93.9 kg (207 lb)   Height: 6' 1" (1.854 m)       General: NAD, well nourished   Eyes: no tearing, discharge, no erythema   ENT: moist mucous membranes of the oral cavity, nares patent    Neck: Supple, full range of motion  Cardiovascular: Warm and well perfused, pulses equal and symmetrical  Lungs: Normal work of breathing, normal chest wall excursions  Skin: No rash, lesions, or breakdown on exposed skin  Psychiatry: Mood and affect are appropriate   Abdomen: soft, non tender, non distended  Extremeties: No cyanosis, clubbing or edema.    Neurological   MENTAL STATUS: Alert and oriented to person, place, and time. Speech without dysarthria, able to name and repeat without difficulty.   CRANIAL NERVES: Visual fields intact. PERRL. EOMI. Facial sensation intact. Face symmetrical. Hearing grossly intact. Full shoulder shrug bilaterally. Tongue protrudes midline   SENSORY: Sensation is intact to light touch and vibration throughout.    MOTOR: Normal bulk and tone. No pronator drift.  5/5 deltoid, biceps, triceps, interosseous, hand  bilaterally. 5/5 iliopsoas, knee extension/flexion, foot dorsi/plantarflexion bilaterally.    CEREBELLAR/COORDINATION/GAIT: Push to stand, gait with somewhat broad base and mild unsteadiness on turns.  "

## 2020-01-15 RX ORDER — HYDROCHLOROTHIAZIDE 25 MG/1
25 TABLET ORAL DAILY
Qty: 90 TABLET | Refills: 0 | Status: SHIPPED | OUTPATIENT
Start: 2020-01-15 | End: 2020-04-17 | Stop reason: SDUPTHER

## 2020-01-15 RX ORDER — LOSARTAN POTASSIUM 50 MG/1
100 TABLET ORAL DAILY
Qty: 180 TABLET | Refills: 0 | Status: SHIPPED | OUTPATIENT
Start: 2020-01-15 | End: 2020-04-17 | Stop reason: SDUPTHER

## 2020-01-20 ENCOUNTER — PATIENT OUTREACH (OUTPATIENT)
Dept: OTHER | Facility: OTHER | Age: 70
End: 2020-01-20

## 2020-01-20 NOTE — PROGRESS NOTES
Digital Medicine: Clinician Follow-Up    Called patient to discuss how he is tolerating amlodipine.    The history is provided by the patient.     Follow Up  Follow-up reason(s): reading review and medication change follow-up      Readings are trending down   Medication Change: new med    Patient started new medication.    Is patient tolerating med change?:  YesPatient resumed amlodipine and is tolerating this change. He is wearing compression socks as needed but at this time, he had no edema since resuming amlodipine at 5mg daily.    Patient could not find the combination losartan-HCTZ and his PCP  the two into two different medication prescriptions.     Patient saw his PCP 1/14 who recommended continuing his current hypertension medications.       INTERVENTION(S)  reviewed appropriate dose schedule and encouragement/support    PLAN  patient verbalizes understanding and continue monitoring    Patient will maintain his current hypertension medication regimen.    Patient will wear his compression socks PRN.               Topic    Lipid (Cholesterol) Test        Last 5 Patient Entered Readings                                      Current 30 Day Average: 145/88     Recent Readings 1/18/2020 1/13/2020 1/13/2020 1/5/2020 1/5/2020    SBP (mmHg) 126 142 141 174 171    DBP (mmHg) 76 89 98 93 91    Pulse 78 84 89 65 66             Hypertension Medications             amLODIPine (NORVASC) 10 MG tablet Take 0.5 tablets (5 mg total) by mouth once daily.    carvedilol (COREG) 12.5 MG tablet Take 1 tablet (12.5 mg total) by mouth 2 (two) times daily with meals.    hydroCHLOROthiazide (HYDRODIURIL) 25 MG tablet Take 1 tablet (25 mg total) by mouth once daily.    losartan (COZAAR) 50 MG tablet Take 2 tablets (100 mg total) by mouth Daily.               Medication Adherence Screening   He misses doses: never      Adherence tools used: cell phone    Patient has a morning and evening alarm set on his phone as a reminder to  take his blood pressure.

## 2020-01-23 ENCOUNTER — PATIENT OUTREACH (OUTPATIENT)
Dept: OTHER | Facility: OTHER | Age: 70
End: 2020-01-23

## 2020-01-23 NOTE — LETTER
January 27, 2020     Blane Kim  [address] [place] [address]       Dear Blane,    Welcome to [facility]! Our goal is to make care effective, proactive and convenient by using data you send us from home to better treat your chronic conditions.              My name is Kathleen Alarcon, and I am your dedicated Digital Medicine clinician. As an expert in medication management, I will help ensure that the medications you are taking continue to provide the intended benefits and help you reach your goals. You can reach me directly at [phone] or by sending me a message directly through your [facility] account.      I am Nai Luna and I will be your health . My job is to help you identify lifestyle changes to improve your disease control. We will talk about nutrition, exercise, and other ways you may be able to adjust your current habits to better your health. Additionally, we will help ensure you are completing the tests and screenings that are necessary to help manage your conditions. You can reach me directly at  or by sending me a message directly through your [facility] account.    Most importantly, YOU are at the center of this team. Together, we will work to improve your overall health and encourage you to meet your goals for a healthier lifestyle.     What we expect from YOU:  · Please take frequent home blood pressure measurements. We ask that you take at least 1 blood pressure reading per week, but more information will better help us get you know you. Be sure you rest for a few minutes before taking the reading in a quiet, comfortable place.     Be available to receive phone calls or [employer] messages, when appropriate, from your care team. Please let us know if there are any specific days or times that work best for us to reach you via phone.     Complete routine tests and screenings. Dont worry, we will help keep you on track!           What you should  expect from your Digital Medicine Care Team:   We will work with you to create a personalized plan of care and provide you with encouragement and education, including regarding lifestyle changes, that could help you manage your disease states.     We will adjust your current medications, if needed, and continue to monitor your long-term progress.     We will provide you and your physician with monthly progress reports after you have been in the program for more than 30 days.     We will send you reminders through [employer] and text messages to help ensure you do not miss any testing deadlines to help manage your disease states.    You will be able to reach us by phone or through your [facility] account by clicking our names under Care Team on the right side of the home screen.    I look forward to working with you to achieve your blood pressure goals!    We look forward to working with you to help manage your health,    Sincerely,    Your Digital Medicine Team    Please visit our websites to learn more:   · Hypertension: [email or web address]      Remember, we are not available for emergencies. If you have an emergency, please contact your doctors office directly or call [facility] ([phone] or [phone]) or [phone].

## 2020-01-27 NOTE — PROGRESS NOTES
Digital Medicine: Health  Follow-Up    Patient returned previous call from last week. Patient states that he recently had a stress test done today. He reports having no changes in diet or exercise. He is still learning how to walk with his torn ACL and being mindful of his sodium intake. He is also concerned with his memory loss causing more stress. Patient was having trouble with his BP cuff. He states that his loading [place] for [employer] keeps going in circles when trying to sync readings making it difficult to know if readings went through to [employer]. Suggested leaving both [employer] and [employer] apps open at the same time. Requested Tech Support's phone number. Provided number and offered my support and contact information if needed further assistance.     The history is provided by the patient.     Follow Up  Follow-up reason(s): reading review and routine education      Readings are trending down due to lifestyle change and medication adherence.    Routine Education Topics: eating patterns and physical activity        INTERVENTION(S)  reviewed monitoring technique, encouragement/support, denied resources and denied questions    PLAN  patient verbalizes understanding and patient amenable to changes          Topic    Lipid (Cholesterol) Test        Last 5 Patient Entered Readings                                      Current 30 Day Average: 141/89     Recent Readings 1/27/2020 1/22/2020 1/22/2020 1/18/2020 1/13/2020    SBP (mmHg) 131 131 143 126 142    DBP (mmHg) 85 85 94 76 89    Pulse 83 83 80 78 84                      Diet Screening   No change to diet.      Physical Activity Screening   No change to exercise routine.          SDOH

## 2020-01-28 ENCOUNTER — PATIENT OUTREACH (OUTPATIENT)
Dept: OTHER | Facility: OTHER | Age: 70
End: 2020-01-28

## 2020-01-28 NOTE — PROGRESS NOTES
Digital Medicine: Health  Follow-Up    Returned patient's previous call in concerns of BP cuff technical issues.     Leaving to go to [place] tomorrow. Patient does return until the following week. Patient reports this cuff not deflating. Suggested patient charge his BP cuff. Patient did not know he had a  and did not know what the [facility] was. Explained that the BP cuff should have came with the . He my need to get BP cuff switched out. DM is now receiving recent readings, ex. 131/85mmHg on 1/27/2020. Patient states that he will attempt to go to the [facility] before his flight tomorrow. If not, patient plans on taking BP cuff to [facility] first thing, returning from vacation.     Will follow-up in 2 weeks. Wished patient safe travels.           Intervention/Plan        Topic    Lipid (Cholesterol) Test        Last 5 Patient Entered Readings                                      Current 30 Day Average: 141/89     Recent Readings 1/27/2020 1/22/2020 1/22/2020 1/18/2020 1/13/2020    SBP (mmHg) 131 131 143 126 142    DBP (mmHg) 85 85 94 76 89    Pulse 83 83 80 78 84                  Screenings    SDOH

## 2020-02-17 ENCOUNTER — PATIENT OUTREACH (OUTPATIENT)
Dept: OTHER | Facility: OTHER | Age: 70
End: 2020-02-17

## 2020-02-17 DIAGNOSIS — I10 ESSENTIAL HYPERTENSION: ICD-10-CM

## 2020-02-28 ENCOUNTER — TELEPHONE (OUTPATIENT)
Dept: INTERNAL MEDICINE | Facility: CLINIC | Age: 70
End: 2020-02-28

## 2020-02-28 DIAGNOSIS — I10 ESSENTIAL HYPERTENSION: Primary | ICD-10-CM

## 2020-02-28 RX ORDER — AMLODIPINE BESYLATE 5 MG/1
5 TABLET ORAL DAILY
Qty: 90 TABLET | Refills: 3 | Status: SHIPPED | OUTPATIENT
Start: 2020-02-28 | End: 2021-02-27

## 2020-02-28 NOTE — TELEPHONE ENCOUNTER
Pt wants to get Norvasc 5mg instead of 10mg. Please, advise. Thanks. Pt was advised from digital medication.

## 2020-02-28 NOTE — TELEPHONE ENCOUNTER
Rx sent to patient's pharmacy for 90 day supply and 3 refills of amlodipine 5 mg.  Please notify patient.

## 2020-03-02 ENCOUNTER — PATIENT OUTREACH (OUTPATIENT)
Dept: OTHER | Facility: OTHER | Age: 70
End: 2020-03-02

## 2020-03-02 NOTE — PROGRESS NOTES
Digital Medicine: Clinician Follow-Up    Called patient to assess how he is tolerating amlodipine and compression socks.    The history is provided by the patient.     Follow Up  Follow-up reason(s): reading review and medication change follow-up      Readings are trending down   Medication Change: new med    Patient started new medication.    Is patient tolerating med change?:  YesPatient is tolerating the edema associated with amlodipine.     Patient's blood pressure is trending down nicely with this change.      INTERVENTION(S)  reviewed appropriate dose schedule and encouragement/support    PLAN  patient verbalizes understanding and continue monitoring    Patient will continue his current hypertension medication regimen.    Patient will contact us with any changes or problems especially if the edema worsens and he cannot tolerate the side effect or any hypotension or hypertension symptoms develop.           Topic    Lipid (Cholesterol) Test        Last 5 Patient Entered Readings                                      Current 30 Day Average: 153/91     Recent Readings 3/1/2020 2/26/2020 2/21/2020 2/12/2020 2/7/2020    SBP (mmHg) 132 154 148 163 163    DBP (mmHg) 80 89 84 92 88    Pulse 99 87 65 65 71             Hypertension Medications             amLODIPine (NORVASC) 5 MG tablet Take 1 tablet (5 mg total) by mouth once daily.    carvedilol (COREG) 12.5 MG tablet Take 1 tablet (12.5 mg total) by mouth 2 (two) times daily with meals.    hydroCHLOROthiazide (HYDRODIURIL) 25 MG tablet Take 1 tablet (25 mg total) by mouth once daily.    losartan (COZAAR) 50 MG tablet Take 2 tablets (100 mg total) by mouth Daily.                 Screenings

## 2020-03-05 ENCOUNTER — PATIENT OUTREACH (OUTPATIENT)
Dept: OTHER | Facility: OTHER | Age: 70
End: 2020-03-05

## 2020-03-05 NOTE — PROGRESS NOTES
Digital Medicine: Health  Follow-Up    Patient is feeling much better since medication change. He is happy with recent readings trending down. He states that he is confident in managing his sodium intake well. He stays active working in his shop as well. No changes in lifestyle routine. Encouraged patient to continue with low-sodium diet and staying active.     The history is provided by the patient.     Follow Up  Follow-up reason(s): reading review and routine education      Readings are trending down due to lifestyle change and medication adherence.    Routine Education Topics: eating patterns and physical activity        Intervention/Plan        Topic    Lipid (Cholesterol) Test        Last 5 Patient Entered Readings                                      Current 30 Day Average: 153/91     Recent Readings 3/1/2020 2/26/2020 2/21/2020 2/12/2020 2/7/2020    SBP (mmHg) 132 154 148 163 163    DBP (mmHg) 80 89 84 92 88    Pulse 99 87 65 65 71                      Diet Screening   No change to diet.  He has the following dietary restrictions: low sodium diet    Barriers to a Healthy Diet: no barriers to healthy eating    Intervention(s): low sodium diet education    Assigning the following patient goals: maintain low sodium diet    Physical Activity Screening   When asked if exercising, patient responded: yesHis level of intensity when exercising is moderate.    Patient participates in the following activities: yard/housework    He identified the following barriers to physical activity: no barriers to being active    Working on cleaning out of his work shop.       SDOH

## 2020-03-16 ENCOUNTER — PATIENT MESSAGE (OUTPATIENT)
Dept: NEUROLOGY | Facility: CLINIC | Age: 70
End: 2020-03-16

## 2020-03-19 ENCOUNTER — PATIENT MESSAGE (OUTPATIENT)
Dept: NEUROLOGY | Facility: CLINIC | Age: 70
End: 2020-03-19

## 2020-03-23 ENCOUNTER — OFFICE VISIT (OUTPATIENT)
Dept: NEUROLOGY | Facility: CLINIC | Age: 70
End: 2020-03-23
Payer: MEDICARE

## 2020-03-23 DIAGNOSIS — R41.3 MEMORY DIFFICULTY: ICD-10-CM

## 2020-03-23 DIAGNOSIS — F41.9 ANXIETY AND DEPRESSION: ICD-10-CM

## 2020-03-23 DIAGNOSIS — G62.9 PERIPHERAL POLYNEUROPATHY: Primary | Chronic | ICD-10-CM

## 2020-03-23 DIAGNOSIS — F32.A ANXIETY AND DEPRESSION: ICD-10-CM

## 2020-03-23 PROCEDURE — 99214 PR OFFICE/OUTPT VISIT, EST, LEVL IV, 30-39 MIN: ICD-10-PCS | Mod: 95,,, | Performed by: PSYCHIATRY & NEUROLOGY

## 2020-03-23 PROCEDURE — 99214 OFFICE O/P EST MOD 30 MIN: CPT | Mod: 95,,, | Performed by: PSYCHIATRY & NEUROLOGY

## 2020-03-23 RX ORDER — PREGABALIN 75 MG/1
75 CAPSULE ORAL 2 TIMES DAILY
Qty: 180 CAPSULE | Refills: 1 | Status: SHIPPED | OUTPATIENT
Start: 2020-03-23 | End: 2020-08-14

## 2020-03-23 NOTE — Clinical Note
Could we get this patient scheduled for Wednesday assessment clinic whenever that's currently scheduled to open again?

## 2020-03-23 NOTE — PROGRESS NOTES
[facility]  [facility] [place]    Date: March 23, 2020   Patient Name: Blane Kim   MRN: [id number]   PCP: Vicente Mckay  Referring Provider: No ref. provider found    Assessment:      The patient location is: home  The chief complaint leading to consultation is: memory and neuropathy  Visit type: Virtual visit with synchronous audio and video  Total time spent with patient: 30 min  Each patient to whom he or she provides medical services by telemedicine is:  (1) informed of the relationship between the physician and patient and the respective role of any other health care provider with respect to management of the patient; and (2) notified that he or she may decline to receive medical services by telemedicine and may withdraw from such care at any time.    This is Blane Kim, 69 y.o. male with atrial fibrillation, HTN, ongoing neuropathic pain despite compliance with B6 supplement with memory difficulty and MOCA 6/2019 20/30.  While MOCA is below normal, he remains fairly independent and able to perform complex tasks raising reservations about whether this is truly Alzheimer dementia and there appears to be some component of anxiety as well as sleep disturbance as well.  He has been unable to tolerate 2 ACEI as well as namenda due to GI upset.  Mobility decline potentially related to deconditioning in setting of pain from orthopedic issues.     Plan:      -  Continue buspar 10mg bid  -  Trial lyrica 75mg bid  -  Plan for memory clinic in 1-2 months  -  Continue cymbalta   -  B6 supplement    Follow up 6 weeks     I discussed side effects of the medications. I asked the patient to stop the medication if he notices serious adverse effects as we discussed and to seek immediate medical attention at an ER.     Izaiah Ansari MD  [facility]   Department of Neurology    Subjective:   -  Weaned off buspar with increased irritability, anxiety, and  memory difficulties.  Wife notes instances in which he was in [place] and could not recall what state he was in, this can be more prominent in social situations such as restaurants where he may have difficulty with ordering.  Notes difficulty carrying through planning out woodworking projects.  He resumed buspar about 10 days ago with tolerable sedation and some improvement in anxiety noted.  -  Frequent night time awakenings to urinate although early morning awakenings as well  -  Difficulty with neuropathy pain    2020:   -  Occasional difficulty getting his bearings while driving but overall memory and cognitive function remain stable since last visit  -  Concerns over decline in mobility over the past 1-2 years, endorses loss of mm mass and reports he is unable to walk a mile and was not able to do so a year ago, difficulties are worse after prolonged sitting  -  No improvement in foot pain with cymbalta, remote trial of GBP unhelpful  -  Some LE edema since starting norvasc    HPI 2019:   Mr. Blane Kim is a 69 y.o. 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  He remains active working in his wood shop and has been able to complete tasks such as repairing power tools.  He was recommended to stop driving on prior visit and has restricted driving to near his home without incident.  He notes some recent difficulty with memory such as not recalling knowing an acquaintance from [place] after they passes away.    -  Diarrhea improved since coming off galantamine and namenda  -  He reports ongoing difficulty with anxiety but on questioning this seems more like situational frustration with things such as dealing with grandchildren, wife's driving, or figuring out how to repair tools.    -  Ongoing burning feeling in feet which is bothersome but does not interfere with  "activities.    Per Dr. Gant 2019:  68 yo M with HTN, HLD, h/o afib s/p MAZE who presents for evaluation of his memory loss, neuropathy, and "fibromyalgia."  His wife is most concerned about his memory.  He reports having had difficulty with his memory for about a year, which he describes as losing his coffee mug and not finding it until the next day, forgetting the names of people he's been friends with for decades, and driving down the street and wondering when they did all that construction (but really, it's the same as it's always been).  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  His wife does most of the shopping, though he attempts sometimes - though it takes about two hours for him to do a 'quick' shopping .  Severe diarrhea x2 years, since starting memory meds (Aricept, now DCd); currently on Namenda + Galantamine.  Wife does not think that he has had brain imaging before.  It sounds as though he has had neuropsych testing x2, but no results are available at this visit.  Some difficulty with walking, attributed to joint pain/prior trauma (offered surgery for ACL, meniscus, but declined).   Burning pain, numbness, tingling to just proximal to knees bilaterally and BUE to level of shoulders xyears.   Pains in multiple joints.    Per Neurologist Dr. Nova   Neurobehavioral/cognitive exam (45+ minutes): Digit span 5 numbers forward. No focal inattention on a cancellation test. Able to repeat sentences while simple commands crossed commands although he erred on syntactically complex question. On the [place] Naming Test-3 scored 15/15 correct. On fluency produced 11 exemplars for the letter S and subsequently 13 exemplars for the category animals. He could not get into set for motor sequencing. On bimanual crossed response testing greater than " "50% errors. Triple loops were relatively accurate although there was a single error on alternating design. Ideomotor praxis 3/3 accurate gestures. On [employer] verbal learning scored 12 on free recall. I recognition he had 9 true positives correct with 1 related false positive error. Miscopied three-dimensional figure. On S[place]chez visual organization 1/3 correct. 2 elements of clock drawing were accurate. Overall score the neurobehavioral exam was 17/30, frontal index = 0.41"    PAST MEDICAL HISTORY:  Past Medical History:   Diagnosis Date    Anxiety and depression     BPH (benign prostatic hyperplasia)     Dementia     GERD (gastroesophageal reflux disease)     H/O mitral valve replacement     Hyperlipidemia     Hypertension     Peripheral neuropathy 6/19/2019    Testosterone deficiency        PAST SURGICAL HISTORY:  No past surgical history on file.    CURRENT MEDS:  Current Outpatient Medications   Medication Sig Dispense Refill    amLODIPine (NORVASC) 5 MG tablet Take 1 tablet (5 mg total) by mouth once daily. 90 tablet 3    busPIRone (BUSPAR) 10 MG tablet Take 1 tablet (10 mg total) by mouth 2 (two) times daily. 180 tablet 3    carvedilol (COREG) 12.5 MG tablet Take 1 tablet (12.5 mg total) by mouth 2 (two) times daily with meals. 180 tablet 3    DULoxetine (CYMBALTA) 60 MG capsule Take 1 capsule (60 mg total) by mouth once daily. 90 capsule 3    ezetimibe (ZETIA) 10 mg tablet Take 1 tablet (10 mg total) by mouth once daily. 90 tablet 3    finasteride (PROSCAR) 5 mg tablet 5 mg.  1    hydroCHLOROthiazide (HYDRODIURIL) 25 MG tablet Take 1 tablet (25 mg total) by mouth once daily. 90 tablet 0    ketoconazole (NIZORAL) 2 % cream by intratympanic route.      levocetirizine (XYZAL) 5 MG tablet Take 1 tablet by mouth.      losartan (COZAAR) 50 MG tablet Take 2 tablets (100 mg total) by mouth Daily. 180 tablet 0    omeprazole (PRILOSEC) 20 MG capsule Take 20 mg by mouth.      testosterone cypionate " (DEPOTESTOTERONE CYPIONATE) 200 mg/mL injection Inject 300 mg into the muscle.      tiZANidine (ZANAFLEX) 4 MG tablet Take 4 mg by mouth.      triamcinolone acetonide 0.1% (KENALOG) 0.1 % ointment by intratympanic route.      warfarin (COUMADIN) 5 MG tablet TAKE ONLY AS DIRECTED BY COUMADIN CLINIC.  TAKE 7.5MG DAILY.       No current facility-administered medications for this visit.        ALLERGIES:  Review of patient's allergies indicates:   Allergen Reactions    Statins-hmg-coa reductase inhibitors Other (See Comments)     Muscle pains/myalgias  Specifically to Lipitor.  causes joints aches    Latex Rash     Can the patient chew gum without allergic reation? Yes  Can the patient blow up a balloon without reaction? Yes  Is the patient allergic to kiwi, bananas, avocado, or chestnuts? No  Immediate itch or reaction to latex gloves? No  Known allergy to latex (i.e. blood test)? Yes  Does the patient require special undergarments, such as panties/briefs with special waist band?yes       FAMILY HISTORY:  No family history on file.    SOCIAL HISTORY:  Social History     Tobacco Use    Smoking status: Former Smoker    Smokeless tobacco: Former User   Substance Use Topics    Alcohol use: Not on file    Drug use: Not on file       Review of Systems:  12 review of systems is negative except for the symptoms mentioned in HPI.        Objective:     There were no vitals filed for this visit.    General: NAD, well nourished   Eyes: no tearing, discharge, no erythema   ENT: moist mucous membranes of the oral cavity, nares patent    Neck: Supple, full range of motion  Cardiovascular: Warm and well perfused  Lungs: Normal work of breathing, normal chest wall excursions  Skin: No rash, lesions, or breakdown on exposed skin  Psychiatry: Mood and affect are appropriate   Abdomen: soft, non tender, non distended  Extremeties: No cyanosis, clubbing or edema.    Neurological   MENTAL STATUS: Alert and oriented to person,  place, and time. Speech without dysarthria, able to name and repeat without difficulty. Provides most of his own history with well directed thought process.  CRANIAL NERVES: Visual fields intact. PERRL. EOMI. Facial sensation intact. Face symmetrical. Hearing grossly intact. Full shoulder shrug bilaterally. Tongue protrudes midline   SENSORY: Sensation is intact to light touch  throughout.    MOTOR: Normal bulk and tone. No pronator drift.   CEREBELLAR/COORDINATION/GAIT: FTN intact.

## 2020-03-24 ENCOUNTER — PATIENT MESSAGE (OUTPATIENT)
Dept: NEUROLOGY | Facility: CLINIC | Age: 70
End: 2020-03-24

## 2020-03-25 DIAGNOSIS — F03.90 DEMENTIA WITHOUT BEHAVIORAL DISTURBANCE, UNSPECIFIED DEMENTIA TYPE: Primary | ICD-10-CM

## 2020-03-27 ENCOUNTER — TELEPHONE (OUTPATIENT)
Dept: NEUROLOGY | Facility: CLINIC | Age: 70
End: 2020-03-27

## 2020-03-30 ENCOUNTER — OFFICE VISIT (OUTPATIENT)
Dept: NEUROLOGY | Facility: CLINIC | Age: 70
End: 2020-03-30
Payer: MEDICARE

## 2020-03-30 DIAGNOSIS — F32.A ANXIETY AND DEPRESSION: ICD-10-CM

## 2020-03-30 DIAGNOSIS — G30.1 LATE ONSET ALZHEIMER'S DISEASE WITHOUT BEHAVIORAL DISTURBANCE: ICD-10-CM

## 2020-03-30 DIAGNOSIS — F02.80 LATE ONSET ALZHEIMER'S DISEASE WITHOUT BEHAVIORAL DISTURBANCE: ICD-10-CM

## 2020-03-30 DIAGNOSIS — F41.9 ANXIETY AND DEPRESSION: ICD-10-CM

## 2020-03-30 DIAGNOSIS — Z95.2 H/O MITRAL VALVE REPLACEMENT WITH MECHANICAL VALVE: ICD-10-CM

## 2020-03-30 DIAGNOSIS — E78.5 HYPERLIPIDEMIA, UNSPECIFIED HYPERLIPIDEMIA TYPE: Primary | ICD-10-CM

## 2020-03-30 DIAGNOSIS — I10 ESSENTIAL HYPERTENSION: ICD-10-CM

## 2020-03-30 DIAGNOSIS — F03.90 DEMENTIA WITHOUT BEHAVIORAL DISTURBANCE, UNSPECIFIED DEMENTIA TYPE: ICD-10-CM

## 2020-03-30 DIAGNOSIS — Z78.9 ALCOHOL USE: ICD-10-CM

## 2020-03-30 PROCEDURE — 99499 UNLISTED E&M SERVICE: CPT | Mod: 95,,, | Performed by: CLINICAL NEUROPSYCHOLOGIST

## 2020-03-30 PROCEDURE — 96116 NUBHVL XM PHYS/QHP 1ST HR: CPT | Mod: 95,,, | Performed by: CLINICAL NEUROPSYCHOLOGIST

## 2020-03-30 PROCEDURE — 96121 PR NEUROBEHAVIORAL STAT EXAM, EA ADDTL HR: ICD-10-PCS | Mod: 95,,, | Performed by: CLINICAL NEUROPSYCHOLOGIST

## 2020-03-30 PROCEDURE — 99499 NO LOS: ICD-10-PCS | Mod: 95,,, | Performed by: CLINICAL NEUROPSYCHOLOGIST

## 2020-03-30 PROCEDURE — 96121 NUBHVL XM PHY/QHP EA ADDL HR: CPT | Mod: 95,,, | Performed by: CLINICAL NEUROPSYCHOLOGIST

## 2020-03-30 PROCEDURE — 96116 PR NEUROBEHAVIORAL STATUS EXAM BY PSYCH/PHYS: ICD-10-PCS | Mod: 95,,, | Performed by: CLINICAL NEUROPSYCHOLOGIST

## 2020-03-30 NOTE — PROGRESS NOTES
NEUROBEHAVIORAL STATUS EXAMINATION - CONFIDENTIAL   NEUROPSYCHOLOGY TELE HEALTH VISIT    Referring Provider: Izaiah Ansari MD   Medical Necessity: Evaluate cognitive functioning, treatment planning/management, and supportive therapy in the setting of thinking changes  Date Conducted:  3/30/2020  Present At Visit: Patient and his wife, An Diego: [id number] = 120 minutes  Consent: The patient expressed an understanding of the purpose of the evaluation and consented to all procedures. He additionally provided consent to speak with wife, who was present during the clinical interview. I reviewed the limits of confidentiality and we discussed an emergency plan.    Telemedicine Details:   The patient location is: At home in [place], [place]  The chief complaint leading to consultation is: progressive thinking changes   Visit type: Virtual visit with synchronous audio and video  Total time spent with patient: 120 minutes  Each patient to whom he or she provides medical services by telemedicine is: (1) informed of the relationship between the physician and patient and the respective role of any other health care provider with respect to management of the patient; and (2) notified that he or she may decline to receive medical services by telemedicine and may withdraw from such care at any time.  Notes: See below.    ASSESSMENT & PLAN:     Mr. Blane Kim is an 69 y.o., White male with his bachelors degree who was referred for a neuropsychological evaluation in the setting of thinking changes      Problem List Items Addressed This Visit        Neuro    Dementia    Overview     MOCA  on 2019  Severe diarrhea with memory medications         Current Assessment & Plan     This patient has had changes in thinking for several years and has required more assistance with IADL completion, thus indicating a diagnosis of Major Neurocognitive Disorder/dementia is warranted.     There are several possible  etiologies/contributors. Given the progressive nature of these changes, a neurodegenerative condition is at the top of the differential (with AD considered the most likely). Second, the patient does have some microvascular ischemic changes which could be causing additional declines in thinking. Third, this patient is consuming a significant amount of alcohol on a daily basis. Fourth, depression and anxiety were uncontrolled for a while due to confusion over medication doses. Fifth, the patient's sleep apnea has been going untreated (albeit his snoring has significantly declined and has not been observed to have an apnea events). Lastly, the patient is prescribed an anticholingeric medication (tizanidine) which could be adding to the thinking changes.     Testing will be helpful to refine differential diagnosis and objectively measure declines in thinking.     Patient was informed that cognitive testing would be scheduled once we are safely able to do so.     She was encouraged to continue participating in behaviors that promote brain health, including maintaining a physical exercise regimen, eating a healthy diet such as the Mediterranean diet, maintaining cognitive and social activity, and getting plenty of good restorative sleep.      Safety precautions for COVID-19 were also discussed.     She was encouraged to reach out via [facility] or by phone if she had questions/concerns. Otherwise, we will be in touch to schedule testing as soon as possible.     The patient was informed that cognitive testing would be scheduled once we are safely able to do so.     He was STRONGLY encouraged to reduce his alcohol consumption, as this could be contributing to his cognitive dysfunction.     He was encouraged to continue participating in behaviors that promote brain health, including maintaining a physical exercise regimen, eating a healthy diet such as the Mediterranean diet, maintaining cognitive and social activity, and  "getting plenty of good restorative sleep.      Safety precautions for COVID-19 were also discussed.     He and his wife were encouraged to reach out via [facility] or by phone if they had questions/concerns. Otherwise, we will be in touch to schedule testing as soon as possible.             Psychiatric    Alcohol use       Cardiac/Vascular    Essential hypertension    H/O mitral valve replacement with mechanical valve    Hyperlipidemia - Primary      If you have any questions, please contact me at [phone].    Fariha Davis, PhD  Licensed Clinical Neuropsychologist  [facility] - Department of Neurology    CLINICAL INTERVIEW & RECORD REVIEW     Cognitive Functioning   Cognitive screener: MoCA = 20/30 (6/18/2019)  Onset & course of difficulty: "Probably longer than a year" (with complaints of memory loss indicated in the records starting in 2018). His wife reported observing extreme memory loss over the past year, with drastic changes over the past 3 to 4 months. He was previously followed by a neurologis in [place] and was diagnosed with Alzheimer's disease.   Fluctuations: None   Examples:   Attention/Processing Speed/Executive Functioning: More easily distracted. Will  his shop trying to figure out what he was about to do. Having difficulty following along in a television show. Harder time organizing his thoughts. Hard to follow along with multiple steps. Harder time following along in and keeping up with conversations. Hard time understanding directions or sequences. Becomes overwhelmed by menu options when they go out to eat. Has a hard time deciding what to order. Difficulty carrying out woodworking projects. Not completing tasks or taking much longer to do so. Was previously taking him 2+ hours to set up a weekly pillbox.   Language: Word finding difficulty. Difficulty comprehending what people are saying if too much information given at one time.   Visuospatial: No problems. " "  Memory: Forgetting what he just read and has to re-read information. Even so, struggling to recall information that he reads. Loses things a lot more. Every single day. Phone, glasses, yeti, a tool in his work shop. Repeating questions to his wife. Sometimes recognizing the information, sometimes not when she repeats it. Harder time learning new things. For example, keeps reaching his hand out to shake hands with people despite COVID-19 recommendations.     Medication for cognition: He has been unable to tolerate 2 ACEI as well as Namenda due to GI upset   Attempts to stay cognitively active: Plays games on the computer. Hardly looks things up on the internet anymore.      Neuropsychiatric Symptoms  Personality: Wife says has always been very opinionated and not afraid to talk. Wife has noticed that he is less and less involved. Quieter when talking in a group, or event with one additional individual.   Mood: Okay   Irritability/Agitation: Endorsed  Aggression: Denied  Depression: Endorsed  Apathy: Endorsed  Anxiety: Endorsed - just got back on Buspar and is feeling better.   Stress: Moderate due to thinking changes   Hallucinations: Difficult to fully assess - "I can visualize in my thoughts that my mother is around. Or my sister." He denied actually seeing images of these individuals.   Delusional/Paranoid Thinking: Endorsed - has thought that other people have been in the house  Impulsive/Compulsive Behaviors: Endorsed. I'm so OCD I have to do it right.   Disinhibition: Denied    Physical Functioning  Pain: Some pain 2/2 torn ACL    Motor: Reduced physical functioning. Peripheral polyneuropathy  Sensory: No issues/concerns    Daily Functioning (I/ADLs)  ADLs: Independent and without difficulty  IADLs:  Finances: Wife took over a couple of years ago. He was not remembering when bills were due, was not keeping up with all the statements and the account was "a mess."   Medication Mgmt: Wife managing his pillbox " for past 3 weeks. He takes his daily doses without difficulty. It was taking him 2+ hours to fill one week pillbox. He also confused directions of how to take Buspar and had stopped taking this for a while. Now back on and mood is much better.   Driving: Driving limited to local areas when wife at work. Does well with this. Previously had gotten lost while driving (several months ago). Sometimes not recognizing the road even when wife driving.   Household Mgmt: Wife predominantly manages  Cooking: Wife primarily manages. She has noticed that if she goes somewhere overnight (such as visiting her daughter) he won't eat unless she has prepared food beforehand.   Appointment Mgmt: Have previously done together but he has recently started just handing the phone over to his wife to schedule appointments.    Neurovegetative Symptoms  Appetite: Reduced appetite with some weight loss. Drinks 2 or 3 cups of coffee. Drinks iced tea and eats ice (doesn't drink water).   Sleep: 7/8 hours of interrupted sleep (getting up to use restroom). Has diagnosed sleep apnea and had a CPAP for several years and then it seemed like the snoring went away so doesn't use it anymore (hasn't worn in 2-3 years).   Energy: Probably low. If he gets outside, he can stay out there all day. He pushes himself to do things.     MEDICAL HISTORY  Development   Prenatal and  development: WNL  Developmental milestones: WNL    This patient has a past medical history of Anxiety and depression, BPH (benign prostatic hyperplasia), Dementia, GERD (gastroesophageal reflux disease), H/O mitral valve replacement, Hyperlipidemia, Hypertension, Peripheral neuropathy (2019), and Testosterone deficiency.    No past surgical history on file.     Neurological History   Headaches/Migraines: Used to have migraines.   TBI: Denied   Seizures: Denied   Stroke: Denied   Tumor: Denied   Previous Episodes of Delirium: Denied   Movement Disorder: Denied   CNS  Infection: Denied   Other: Denied     Neurodiagnostics  MRI brain 7/9/2019:  FINDINGS:  Is the diffusion sequence is normal without evidence of acute ischemia or infarct.  GRE images demonstrate no significant blood products.  There is mild involutional change in mild microvascular small vessel ischemic change.  Pituitary craniocervical junction show nothing unusual.  All flow voids are present were expected.  There cataract implants.  There is small vessel ischemic change of the cerebellar hemispheres.  Sinuses are clear.  Skull and skull base show nothing unusual.      Impression       No acute process seen.    Involutional change and small vessel ischemic change.     Pertinent Lab Work  Lab Results   Component Value Date    [id number] 490 06/18/2019     Lab Results   Component Value Date    RPR Non-reactive 06/18/2019     No results found for: FOLATE  Lab Results   Component Value Date    TSH 2.506 06/18/2019     Lab Results   Component Value Date    HGBA1C 5.1 06/18/2019     No results found for: HIV1X2, [id number]    Psychiatric History  Prior Diagnoses: Depression and Anxiety   History of Trauma/Abuse: No abuse. Sister was rebellious and there was a lot of discomfort in the home because of it   History of Suicide Attempts: Denied attempts, but has had thoughts  Current Ideation, Intention, or Plan: No recent ideation  Homicidal Ideation: Denied   Medication(s): Buspar. Wife says 2 or 3 weeks ago depression and anxiety were pretty bad but since getting medication back on track, they are much improved  Hospitalization(s): None  Psychotherapy/Counseling: Once every 2 weeks for 3 months about a year ago     Substance Use History  Social History     Tobacco Use    Smoking status: Former Smoker    Smokeless tobacco: Former User   Substance and Sexual Activity    Alcohol use: Not on file    Drug use: Not on file    Sexual activity: Not on file     Current Alcohol Use: His wife reports that over time, he has  "become more of a drinker. Consuming 3 or 4 beers a day and taking "swigs" of Fireball. She can tell that he is intoxicated. Will become confrontational when he drinks.   Current Tobacco Use: Quit smoking and dipping in 1994  Drug Use: None    Medications    Current Outpatient Medications:     amLODIPine (NORVASC) 5 MG tablet, Take 1 tablet (5 mg total) by mouth once daily., Disp: 90 tablet, Rfl: 3    busPIRone (BUSPAR) 10 MG tablet, Take 1 tablet (10 mg total) by mouth 2 (two) times daily., Disp: 180 tablet, Rfl: 3    carvedilol (COREG) 12.5 MG tablet, Take 1 tablet (12.5 mg total) by mouth 2 (two) times daily with meals., Disp: 180 tablet, Rfl: 3    DULoxetine (CYMBALTA) 60 MG capsule, Take 1 capsule (60 mg total) by mouth once daily., Disp: 90 capsule, Rfl: 3    ezetimibe (ZETIA) 10 mg tablet, Take 1 tablet (10 mg total) by mouth once daily., Disp: 90 tablet, Rfl: 3    finasteride (PROSCAR) 5 mg tablet, 5 mg., Disp: , Rfl: 1    hydroCHLOROthiazide (HYDRODIURIL) 25 MG tablet, Take 1 tablet (25 mg total) by mouth once daily., Disp: 90 tablet, Rfl: 0    ketoconazole (NIZORAL) 2 % cream, by intratympanic route., Disp: , Rfl:     levocetirizine (XYZAL) 5 MG tablet, Take 1 tablet by mouth., Disp: , Rfl:     losartan (COZAAR) 50 MG tablet, Take 2 tablets (100 mg total) by mouth Daily., Disp: 180 tablet, Rfl: 0    omeprazole (PRILOSEC) 20 MG capsule, Take 20 mg by mouth., Disp: , Rfl:     pregabalin (LYRICA) 75 MG capsule, Take 1 capsule (75 mg total) by mouth 2 (two) times daily., Disp: 180 capsule, Rfl: 1    testosterone cypionate (DEPOTESTOTERONE CYPIONATE) 200 mg/mL injection, Inject 300 mg into the muscle., Disp: , Rfl:     tiZANidine (ZANAFLEX) 4 MG tablet, Take 4 mg by mouth., Disp: , Rfl:     triamcinolone acetonide 0.1% (KENALOG) 0.1 % ointment, by intratympanic route., Disp: , Rfl:     warfarin (COUMADIN) 5 MG tablet, TAKE ONLY AS DIRECTED BY COUMADIN CLINIC.  TAKE 7.5MG DAILY., Disp: , Rfl: " "    Family Neurological & Psychiatric History    No family history on file.  Neurologic: Maternal aunt had dementia (later in age when had thinking changes, 70 carmelo per his wife)  Psychiatric: Negative for heritable risk factors.    EDUCATION, OCCUPATION, & SOCIAL HISTORY   Education  Level Attained: Degrees in biblical studies and sociology   Learning/Attention/Behavior Difficulties: None  Repeated Grade(s): None  Typical Grades: Not the star student but he passed    Occupation   Service: Joined but could not go bc he had ulcers  Occupational Status: Retired  Primary Occupation:  & then [employer] service for 21 years     Social  Family Status:  to wife 35 years. 2 daughters   Support System: Good. Wife and does have a friend that he is close with   Hobbies: Woodworking  Current Living Situation: He and his wife     OBJECTIVE:     PROCEDURES/TESTS ADMINISTERED: In addition to performing a review of pertinent medical records, reviewing limits to confidentiality, conducting a clinical interview, and explaining procedures, the following measures were administered: [place] Instrumental Activities of Daily Living Scale (IADL); The Neuropsychiatric Inentory Questionnaire (NPI-Q); and Patient Health Questionnaire (PHQ-9).     MENTAL STATUS AND OBSERVATIONS:   Appearance: Casually dressed and adequate grooming/hygiene.   Alertness: Attentive and alert.   Orientation: Oriented to person and place. He was unsure of the day of the month and stated the day of the week was "Tuesday" (Monday). He was oriented to year and month.   Gait: Unable to assess  Motor movements/mannerisms: Unable to assess  Speech/language: Normal in rate, rhythm, tone, and volume. Mild word finding difficulty observed. Some difficulty comprehending the questions being asked. Would need questions repeated and at times, simplified.   Mood/Affect: The patients stated mood was "okay." Affect was congruent with stated " mood.   Interpersonal Behavior: Rapport was quickly and easily established   Suicidality/Homicidality: Denied  Hallucinations/Delusions: None evidenced or endorsed  Thought Content & Processes:Thoughts seemed logical and goal-directed.   Insight & Judgment: Appropriate  Participation in Clinical Interview: Full but looked to his wife to help clarify information or provide additional details.     TESTS RESULTS/QUESTIONNAIRES    The patient was asked to learn and remember 3 words at the start of our appointment. He correctly repeated back 3/3 words. After a delay, he freely recalled 1/3 words and with semantic cueing was able to recall the additional 2 words.     IADL 3/30/2020   Ability to Use Telephone Operates telephone on own initiative, looks up and dials numbers   Shopping Shops independently for small purchases   Food Preparation Prepares adequate meals if supplied with ingredients   Housekeeping Maintains house alone with occasional assistance (heavy work)   Laundry Launders small items, rinses socks, stockings, etc   Mode of Transportation Travel limited to taxi or automobile with assistance of another   Responsibility for Own Medications Takes responsibility if medication is prepared in advance in separate dosages       NPIQ RFS 3/30/2020   WHO IS FILLING OUT FORM? Caregiver   Does this patient have false beliefs, such as thinking that others are stealing from him/her or planning to harm him/her in some way? Yes   Delusions Severity 2   Delusion Distress 4   Does this patient have hallucinations such as false visions or voices? Scott she/he seem to hear or see things that are not present? Yes   Hallucination Severity 1   Hallucination Distress 4   Is the patient resistive to help from others at times, or hard to handle? Yes   Does the patient seem sad or say that he/she is depressed? Yes   Depression/Dysphoria Severity 3   Depression/Dysphoria Distress 6   Does the patient become upset when  from you?  Does he/she have any other signs of nervousness such as shortness of breath, sighing, being unable tor elax, or feeling excessively tense? Yes   Anxiety Severity 2   Anxiety Distress 4   Does the patient appear to feel good or act excessively happy? Yes   Elation/Euphoria Severity 2   Elation/Euphoria Distress 4   Does this patient seem less interested in his/her usual activities or in the activities and plans of others? Yes   Apathy/Indifference Severity 3   Apathy/Indifference Distress 5   Does this patient seem to act cumpolsively, for example, talking to strangers as if she/he knows them, or saying things that may hurt people's feelings? No   Is the patient impatient and cranky? Does he/she have difficulty coping with delays or waiting for planned activities? Yes   Irritability/Liability Severity 3   Irritability/Liability Distress 5   Does the patient engage in repetitive activities such as pacing around the house, handling buttons, wrapping string, or doing other things repeatedly? Yes   Motor Disturbance Severity 3   Motor Disturbance Distress 4   Does this patient awaken you during the night, rise too early in the morning, or take excessive naps during the day? No   Has the patient lost or gained weight, or had a change in the type of food he/she likes? Yes   Apetitie/Eating Severity 3   Apetite/Eating Distress 4   NPI Total Severity Score 22   NPI Total Distress Score 40       PHQ9 3/30/2020   Total Score 17

## 2020-04-05 PROBLEM — Z78.9 ALCOHOL USE: Status: ACTIVE | Noted: 2020-04-05

## 2020-04-05 NOTE — ASSESSMENT & PLAN NOTE
This patient has had changes in thinking for several years and has required more assistance with IADL completion, thus indicating a diagnosis of Major Neurocognitive Disorder/dementia is warranted.     There are several possible etiologies/contributors. Given the progressive nature of these changes, a neurodegenerative condition is at the top of the differential (with AD considered the most likely). Second, the patient does have some microvascular ischemic changes which could be causing additional declines in thinking. Third, this patient is consuming a significant amount of alcohol on a daily basis. Fourth, depression and anxiety were uncontrolled for a while due to confusion over medication doses. Fifth, the patient's sleep apnea has been going untreated (albeit his snoring has significantly declined and has not been observed to have an apnea events). Lastly, the patient is prescribed an anticholingeric medication (tizanidine) which could be adding to the thinking changes.     Testing will be helpful to refine differential diagnosis and objectively measure declines in thinking.     Patient was informed that cognitive testing would be scheduled once we are safely able to do so.     She was encouraged to continue participating in behaviors that promote brain health, including maintaining a physical exercise regimen, eating a healthy diet such as the Mediterranean diet, maintaining cognitive and social activity, and getting plenty of good restorative sleep.      Safety precautions for COVID-19 were also discussed.     She was encouraged to reach out via [facility] or by phone if she had questions/concerns. Otherwise, we will be in touch to schedule testing as soon as possible.     The patient was informed that cognitive testing would be scheduled once we are safely able to do so.     He was STRONGLY encouraged to reduce his alcohol consumption, as this could be contributing to his cognitive dysfunction.     He was  encouraged to continue participating in behaviors that promote brain health, including maintaining a physical exercise regimen, eating a healthy diet such as the Mediterranean diet, maintaining cognitive and social activity, and getting plenty of good restorative sleep.      Safety precautions for COVID-Lizzette were also discussed.     He and his wife were encouraged to reach out via [facility] or by phone if they had questions/concerns. Otherwise, we will be in touch to schedule testing as soon as possible.

## 2020-04-13 ENCOUNTER — PATIENT MESSAGE (OUTPATIENT)
Dept: NEUROLOGY | Facility: CLINIC | Age: 70
End: 2020-04-13

## 2020-04-14 ENCOUNTER — TELEPHONE (OUTPATIENT)
Dept: NEUROLOGY | Facility: CLINIC | Age: 70
End: 2020-04-14

## 2020-04-14 NOTE — TELEPHONE ENCOUNTER
"Returned this patient's wife's phone call (13 minutes). She stated that she became very concerned about the patient on  when he became acutely confused and stayed confused over the course of the day. He stated that he felt "funny" but was unable to describe this further. He was talking about his parents and asking if they were alive or . He also asked his wife who else was in the house with them. She reported this confusion attenuated on Monday and he has been back to normal today.     We discussed that this could be related to underlying metabolic changes, whether due to infection, dehydration, or medication. She was encouraged to call his PCP office to see what they would recommend. Encouraged to monitor and keep a log and call back with changes. She assured me that her question had been answered and that she would indeed follow up with his PCP.       "

## 2020-04-17 RX ORDER — HYDROCHLOROTHIAZIDE 25 MG/1
25 TABLET ORAL DAILY
Qty: 90 TABLET | Refills: 3 | Status: SHIPPED | OUTPATIENT
Start: 2020-04-17 | End: 2021-04-17

## 2020-04-17 RX ORDER — LOSARTAN POTASSIUM 50 MG/1
100 TABLET ORAL DAILY
Qty: 180 TABLET | Refills: 3 | Status: SHIPPED | OUTPATIENT
Start: 2020-04-17 | End: 2020-08-14

## 2020-05-01 ENCOUNTER — PATIENT OUTREACH (OUTPATIENT)
Dept: OTHER | Facility: OTHER | Age: 70
End: 2020-05-01

## 2020-05-01 NOTE — PROGRESS NOTES
Digital Medicine: Health  Follow-Up    AVG. BP is 114/71. No questions or concerns today. Patient is feeling well. He reports tearing his ACL two years ago and never did anything about it. He was interested in getting a full knee replacement but never had it done. He reports mowing the lawn the other day, stepped wrong, and now his knee has been giving him a lot of pain. He has an appointment scheduled for Monday to get checked and an x-ray. He is open to getting surgery.     Overall, patient's lifestyle remains the same. Exercise is reduced due to knee pain. Suggested patient rest his knee.    The history is provided by the patient.     Follow Up  Follow-up reason(s): reading review and routine education      Readings are trending down due to lifestyle change and medication adherence.    Routine Education Topics: eating patterns and physical activity        INTERVENTION(S)  encouragement/support, denied resources and denied questions    PLAN  patient verbalizes understanding, patient amenable to changes and continue monitoring    Suggested patient rest knee until patient's appt and is advised otherwise.   Will f/u in 4-6 weeks and continue to monitor BP.           Topic    Lipid (Cholesterol) Test        Last 5 Patient Entered Readings                                      Current 30 Day Average: 114/71     Recent Readings 4/30/2020 4/25/2020 4/15/2020 4/4/2020 3/31/2020    SBP (mmHg) 110 109 121 114 124    DBP (mmHg) 60 69 80 74 75    Pulse 74 66 57 66 70                      Diet Screening   No change to diet.      Physical Activity Screening   When asked if exercising, patient responded: yes  Patient has limited mobility: new injury  Patient has the following chronic pain: kneeHis level of intensity when exercising is low.    He identified the following barriers to physical activity: pain/injury/recent surgery      SDOH

## 2020-06-16 ENCOUNTER — PATIENT OUTREACH (OUTPATIENT)
Dept: OTHER | Facility: OTHER | Age: 70
End: 2020-06-16

## 2020-06-16 NOTE — PROGRESS NOTES
"Digital Medicine: Health  Follow-Up    Patient reached out to discuss troubles with his blood pressure device. Patient states that the device will not turn on. Discussed charging the device. Patient states that he did not know it came with a . Explained how it should have came with the device and advised him to charge it every 30 days. Patient understood and states " it may have I just have not looked ". Sending link to patient with appropriate cord for device.     Routine f/u to check in.   Patient reports doing well. BP looks great. No complaints today.   Advised patient to reachout if still having trouble with device.     The history is provided by the patient.   Follow Up  Follow-up reason(s): reading review and routine education      Readings are trending down due to lifestyle change and medication adherence.    Routine Education Topics: eating patterns and physical activity        INTERVENTION(S)  recommend physical activity, encouragement/support, denied resources and denied questions    PLAN  patient verbalizes understanding and patient amenable to changes    Will f/u in 4-6 weeks.           Topic    Lipid (Cholesterol) Test        Last 5 Patient Entered Readings                                      Current 30 Day Average: 118/70     Recent Readings 6/7/2020 6/6/2020 6/6/2020 5/28/2020 5/28/2020    SBP (mmHg) 116 129 130 116 116    DBP (mmHg) 71 78 75 70 70    Pulse 77 86 86 60 60                      Diet Screening   No change to diet.  He has the following dietary restrictions: low sodium diet    Physical Activity Screening   When asked if exercising, patient responded: unable  Patient has limited mobility: recovering from surgery/rehab    He identified the following barriers to physical activity: pain/injury/recent surgery    Patient states that he still is having trouble with his torn ACL.  He states that he has been staying off of it not exercising as much.    Encouraged patient to take " care of his knee and go at his own pace with exercise routine.      SDOH

## 2020-07-01 ENCOUNTER — INITIAL CONSULT (OUTPATIENT)
Dept: NEUROLOGY | Facility: CLINIC | Age: 70
End: 2020-07-01
Payer: MEDICARE

## 2020-07-01 DIAGNOSIS — Z95.2 H/O MITRAL VALVE REPLACEMENT WITH MECHANICAL VALVE: ICD-10-CM

## 2020-07-01 DIAGNOSIS — F43.23 ADJUSTMENT DISORDER WITH MIXED ANXIETY AND DEPRESSED MOOD: ICD-10-CM

## 2020-07-01 DIAGNOSIS — I10 ESSENTIAL HYPERTENSION: Primary | ICD-10-CM

## 2020-07-01 DIAGNOSIS — E78.5 HYPERLIPIDEMIA, UNSPECIFIED HYPERLIPIDEMIA TYPE: ICD-10-CM

## 2020-07-01 DIAGNOSIS — F02.818 MAJOR NEUROCOGNITIVE DISORDER DUE TO MULTIPLE ETIOLOGIES WITH BEHAVIORAL DISTURBANCE: ICD-10-CM

## 2020-07-01 DIAGNOSIS — Z78.9 ALCOHOL USE: ICD-10-CM

## 2020-07-01 PROCEDURE — 99499 NO LOS: ICD-10-PCS | Mod: S$PBB,,, | Performed by: CLINICAL NEUROPSYCHOLOGIST

## 2020-07-01 PROCEDURE — 99499 UNLISTED E&M SERVICE: CPT | Mod: S$PBB,,, | Performed by: CLINICAL NEUROPSYCHOLOGIST

## 2020-07-01 PROCEDURE — 96133 PR NEUROPSYCHOLOGIC TEST EVAL SVCS, EA ADDTL HR: ICD-10-PCS | Mod: ,,, | Performed by: CLINICAL NEUROPSYCHOLOGIST

## 2020-07-01 PROCEDURE — 99211 OFF/OP EST MAY X REQ PHY/QHP: CPT | Mod: PBBFAC | Performed by: CLINICAL NEUROPSYCHOLOGIST

## 2020-07-01 PROCEDURE — 96138 PR PSYCH/NEUROPSYCH TEST ADMIN/SCORING, BY TECH, 2+ TESTS, 1ST 30 MIN: ICD-10-PCS | Mod: ,,, | Performed by: CLINICAL NEUROPSYCHOLOGIST

## 2020-07-01 PROCEDURE — 96132 NRPSYC TST EVAL PHYS/QHP 1ST: CPT | Mod: ,,, | Performed by: CLINICAL NEUROPSYCHOLOGIST

## 2020-07-01 PROCEDURE — 96133 NRPSYC TST EVAL PHYS/QHP EA: CPT | Mod: ,,, | Performed by: CLINICAL NEUROPSYCHOLOGIST

## 2020-07-01 PROCEDURE — 96139 PR PSYCH/NEUROPSYCH TEST ADMIN/SCORING, BY TECH, 2+ TESTS, EA ADDTL 30 MIN: ICD-10-PCS | Mod: ,,, | Performed by: CLINICAL NEUROPSYCHOLOGIST

## 2020-07-01 PROCEDURE — 96138 PSYCL/NRPSYC TECH 1ST: CPT | Mod: ,,, | Performed by: CLINICAL NEUROPSYCHOLOGIST

## 2020-07-01 PROCEDURE — 96132 PR NEUROPSYCHOLOGIC TEST EVAL SVCS, 1ST HR: ICD-10-PCS | Mod: ,,, | Performed by: CLINICAL NEUROPSYCHOLOGIST

## 2020-07-01 PROCEDURE — 99999 PR PBB SHADOW E&M-EST. PATIENT-LVL I: ICD-10-PCS | Mod: PBBFAC,,, | Performed by: CLINICAL NEUROPSYCHOLOGIST

## 2020-07-01 PROCEDURE — 96139 PSYCL/NRPSYC TST TECH EA: CPT | Mod: ,,, | Performed by: CLINICAL NEUROPSYCHOLOGIST

## 2020-07-01 PROCEDURE — 99999 PR PBB SHADOW E&M-EST. PATIENT-LVL I: CPT | Mod: PBBFAC,,, | Performed by: CLINICAL NEUROPSYCHOLOGIST

## 2020-07-03 NOTE — PROGRESS NOTES
NEUROPSYCHOLOGICAL EVALUATION - CONFIDENTIAL       Referring Provider: Izaiah Ansari MD   Medical Necessity: Evaluate cognitive functioning, treatment planning/management, and supportive therapy in the setting of thinking changes  Date Conducted:  3/30/2020 & 7/1/2020  Present At Visit: Patient and his wife, An Diego: See table at end of report  Consent: The patient expressed an understanding of the purpose of the evaluation and consented to all procedures. He additionally provided consent to speak with wife, who was present during the clinical interview. I reviewed the limits of confidentiality and we discussed an emergency plan.    ASSESSMENT & PLAN:     Mr. Blane Kim is an 70 y.o., White male with his bachelors degree who was referred for a neuropsychological evaluation in the setting of thinking changes      Problem List Items Addressed This Visit        Neuro    Major neurocognitive disorder due to multiple etiologies with behavioral disturbance    Overview     MOCA 20/30 on 6/18/2019  Severe diarrhea with memory medications         Current Assessment & Plan     Compared to average range premorbid estimates, results of the current evaluation suggest significant declines/weaknesses in executive functioning, attention/working memory, learning, and visuospatial skills, with milder declines in mental processing speed and memory retrieval. His recall of previously learned information improved when he was provided with cues. His language skills were at expectation, with the exception of reduced semantic verbal fluency.    The deficits in cognitive functioning combined with his difficulty in IADL completion suggest that a diagnosis of Major Neurocognitive Disorder/dementia is appropriate, with a neurodegenerative condition thought likely.     Importantly, while the patient has had steady and gradual decline for a few years, he recently experienced a fluctuation in mental status without (to my  knowledge) an underlying medical cause identified. This, combined with report of reduced physical functioning raises concern for a Lewy Body dementia process. Alzheimer's disease also remains in the differential given weaknesses in learning and memory (albeit he is still benefiting from recognition memory, indicating that he is not fully amnestic at this time). His cardiovascular risk factors and mild ischemic changes may be playing a small role in his cognitive dysfunction, as may his increased alcohol use and mood symptoms.     Re-evaluation in 6 to 12 months will be beneficial to continue to monitor cognition and refine etiology. Periodic check-ins based on patient's needs will be offered and scheduled accordingly.     Our [facility] team will be given his contact information to determine if he qualifies for care (and is interested in receiving care) through the research study. If not, our LCSW will reach out to him and his wife.     He has been unable to tolerate cognitive enhancing medications. Mood symptoms have started to improve with correct dosage of Cymbalta and Buspar. Continue to monitor.     The patient continues to drive but does so in a limited manner. Given his current test scores, he is at increased risk for making errors while driving. He would likely benefit from undergoing a driving evaluation to help determine if he is indeed safe to do so.     This patients continued use of alcohol may contribute to current cognitive difficulties. Therefore, it is recommended that he or she stop drinking as this may lead to an improvement in thinking skills    A personal history of disorders that affect the cardiovascular system (e.g., hypertension, high cholesterol, diabetes, heart disease) can have a negative impact on brain functioning especially over many years. Therefore, it is very important for this patient to maintain good control over his risk factors. The following is recommended:  · Take all  medications as prescribed and follow-up with recommendations above.  · Get regular physical exercise to the extent that it is possible.   · Eat a well-balanced diet and following the MIND diet (see handout) has been shown to be most brain protective.   · Check your blood pressure, cholesterol levels, blood sugar, and others as appropriate.    It is recommended that legal documents be put into place to allow others to make healthcare and financial decisions for this patient should he become unable to do so. He should consider designating a durable power of  for healthcare and finances, completing advanced directives for healthcare decisions, and estate planning (e.g., finalizing a will) at this time.     We recommend the book The 36-Hour Day: A Family Guide to Caring for Persons with Alzheimer Disease, Related Dementing Illnesses, and Memory Loss in Later Life by Aida Buenrostro and Christiano Emerson for practical advice to make caregiving easier and improve quality of life for both the patient and family.     The Alzheimers Association is a great resource for caregivers and patients who have all types of dementia or memory loss (not just Alzheimers). Visit the Alzheimers Association website at http://www.alz.org to find chapters near you.                 Psychiatric    Adjustment disorder with mixed anxiety and depressed mood    Overview     Prescribed Buspar and Cymbalta         Alcohol use - Primary       Cardiac/Vascular    Essential hypertension    H/O mitral valve replacement with mechanical valve    Hyperlipidemia      If you have any questions, please contact me at [phone].    Fariha Davis, PhD  Licensed Clinical Neuropsychologist  [facility] - Department of Neurology    CLINICAL INTERVIEW & RECORD REVIEW     Cognitive Functioning   Cognitive screener: MoCA = 20/30 (6/18/2019)  Previous evaluation:   Per Neurologist Dr. Nova 2018  Neurobehavioral/cognitive exam (45+ minutes):  "Digit span 5 numbers forward. No focal inattention on a cancellation test. Able to repeat sentences while simple commands crossed commands although he erred on syntactically complex question. On the [place] Naming Test-3 scored 15/15 correct. On fluency produced 11 exemplars for the letter S and subsequently 13 exemplars for the category animals. He could not get into set for motor sequencing. On bimanual crossed response testing greater than 50% errors. Triple loops were relatively accurate although there was a single error on alternating design. Ideomotor praxis 3/3 accurate gestures. On [place] verbal learning scored 12 on free recall. I recognition he had 9 true positives correct with 1 related false positive error. Miscopied three-dimensional figure. On Sánchez visual organization 1/3 correct. 2 elements of clock drawing were accurate. Overall score the neurobehavioral exam was 17/30, frontal index = 0.41"    Onset & course of difficulty: "Probably longer than a year" (with complaints of memory loss indicated in the records starting in 2018). His wife reported observing more severe memory loss over the past year, with drastic changes over the past 3 to 4 months. He was previously followed by a neurologist in [place] and was diagnosed with Alzheimer's disease.   Fluctuations: None at time of clinical interview. On 20, the patient's wife called and stated that the patient became acutely confused and stayed confused over the course of a day. The patient told his wife that he felt "funny" but was unable to describe this further. He was talking about his parents and asking if they were alive or . He also asked his wife who else was in the house with them. She was encouraged to follow up with PCP to ensure there was not an underlying metabolic cause.   Examples:   Attention/Processing Speed/Executive Functioning: More easily distracted. Will  his shop trying to figure out what he was about to " do. Having difficulty following along in a television show. Harder time organizing his thoughts. Hard to follow along with multiple steps. Harder time following along in and keeping up with conversations. Hard time understanding directions or sequences. Becomes overwhelmed by menu options when they go out to eat. Has a hard time deciding what to order. Difficulty carrying out woodworking projects. Not completing tasks or taking much longer to do so. Was previously taking him 2+ hours to set up a weekly pillbox.   Language: Word finding difficulty. Difficulty comprehending what people are saying if too much information given at one time.   Visuospatial: Sometimes not recognizing the road even when wife driving. Driving down the street and wondering when they did all that construction (but really, it's the same as it's always been).   Memory: Forgetting what he just read and has to re-read information. Even so, struggling to recall information that he reads. Loses things a lot more. Every single day. Phone, glasses, yeti, a tool in his work shop. Repeating questions to his wife. Sometimes recognizing the information, sometimes not when she repeats it. Harder time learning new things. For example, keeps reaching his hand out to shake hands with people despite COVID-19 recommendations.     Medication for cognition: He has been unable to tolerate 2 ACEI as well as Namenda due to GI upset   Attempts to stay cognitively active: Plays games on the computer. Hardly looks things up on the internet anymore.      Neuropsychiatric Symptoms  Personality: Wife says has always been very opinionated and not afraid to talk. Wife has noticed that he is less and less involved. Quieter when talking in a group, or event with one additional individual.   Mood: Okay   Irritability/Agitation: Endorsed  Aggression: Denied  Depression: Endorsed  Apathy: Endorsed  Anxiety: Endorsed - just got back on Buspar and is feeling better.   Stress:  "Moderate due to thinking changes   Hallucinations: Difficult to fully assess - "I can visualize in my thoughts that my mother is around. Or my sister." He denied actually seeing images of these individuals.   Delusional/Paranoid Thinking: Endorsed - has thought that other people have been in the house  Impulsive/Compulsive Behaviors: Endorsed - has to do things a certain way sometimes. Does not interfere with daily functioning.   Disinhibition: Denied  Neurovegetative Symptoms  Appetite: Reduced appetite with some weight loss. Drinks 2 or 3 cups of coffee. Drinks iced tea and eats ice (doesn't drink water).   Sleep: 7/8 hours of interrupted sleep (getting up to use restroom). Has diagnosed sleep apnea and had a CPAP for several years and then it seemed like the snoring went away so doesn't use it anymore (hasn't worn in 2-3 years).   Energy: Probably low. If he gets outside, he can stay out there all day. He pushes himself to do things.     NPIQ RFS 3/30/2020   WHO IS FILLING OUT FORM? Caregiver   Does this patient have false beliefs, such as thinking that others are stealing from him/her or planning to harm him/her in some way? Yes   Delusions Severity 2   Delusion Distress 4   Does this patient have hallucinations such as false visions or voices? Scott she/he seem to hear or see things that are not present? Yes   Hallucination Severity 1   Hallucination Distress 4   Is the patient resistive to help from others at times, or hard to handle? Yes   Agitation Agression Severity 3   Agitation/Agression Distress 6   Does the patient seem sad or say that he/she is depressed? Yes   Depression/Dysphoria Severity 3   Depression/Dysphoria Distress 6   Does the patient become upset when  from you? Does he/she have any other signs of nervousness such as shortness of breath, sighing, being unable tor elax, or feeling excessively tense? Yes   Anxiety Severity 2   Anxiety Distress 4   Does the patient appear to feel good or " "act excessively happy? Yes   Elation/Euphoria Severity 2   Elation/Euphoria Distress 5   Does this patient seem less interested in his/her usual activities or in the activities and plans of others? Yes   Apathy/Indifference Severity 3   Apathy/Indifference Distress 5   Does this patient seem to act cumpolsively, for example, talking to strangers as if she/he knows them, or saying things that may hurt people's feelings? No   Is the patient impatient and cranky? Does he/she have difficulty coping with delays or waiting for planned activities? Yes   Irritability/Liability Severity 3   Irritability/Liability Distress 5   Does the patient engage in repetitive activities such as pacing around the house, handling buttons, wrapping string, or doing other things repeatedly? Yes   Motor Disturbance Severity 3   Motor Disturbance Distress 4   Does this patient awaken you during the night, rise too early in the morning, or take excessive naps during the day? No   Has the patient lost or gained weight, or had a change in the type of food he/she likes? Yes   Apetitie/Eating Severity 3   Apetite/Eating Distress 4   NPI Total Severity Score 25   NPI Total Distress Score 47     Physical Functioning  Pain: Some pain 2/2 torn ACL    Motor: Reduced physical functioning. Peripheral polyneuropathy. Difficulty maintaining balance. "Mobility decline potentially related to deconditioning in setting of pain from orthopedic issues" per Dr. Ansari's notes. Neuro exam with Dr. Ansari indicated "Push to stand, gait with somewhat broad base and mild unsteadiness on turns"  Sensory: No issues/concerns    Daily Functioning (I/ADLs)  ADLs: Independent and without difficulty  IADLs:  Finances: Wife took over a couple of years ago. He was not remembering when bills were due, was not keeping up with all the statements and the account was "a mess."   Medication Mgmt: Wife managing his pillbox for past 3 weeks. He takes his daily doses without " difficulty. It was taking him 2+ hours to fill one week pillbox. He also confused directions of how to take Buspar and had stopped taking this for a while. Now back on and mood is much better.   Driving: Driving limited to local areas when wife at work. Does well with this. Previously had gotten lost while driving (several months ago). Sometimes not recognizing the road even when wife driving.   Household Mgmt: Wife predominantly manages  Cooking: Wife primarily manages. She has noticed that if she goes somewhere overnight (such as visiting her daughter) he won't eat unless she has prepared food beforehand.   Appointment Mgmt: Have previously done together but he has recently started just handing the phone over to his wife to schedule appointments.    IADL 3/30/2020   Ability to Use Telephone Operates telephone on own initiative, looks up and dials numbers   Shopping Shops independently for small purchases   Food Preparation Prepares adequate meals if supplied with ingredients   Housekeeping Maintains house alone with occasional assistance (heavy work)   Laundry Launders small items, rinses socks, stockings, etc   Mode of Transportation Travel limited to taxi or automobile with assistance of another   Responsibility for Own Medications Takes responsibility if medication is prepared in advance in separate dosages     MEDICAL HISTORY  Development   Prenatal and  development: WNL  Developmental milestones: WNL    This patient has a past medical history of Anxiety and depression, BPH (benign prostatic hyperplasia), Dementia, GERD (gastroesophageal reflux disease), H/O mitral valve replacement, Hyperlipidemia, Hypertension, Peripheral neuropathy (2019), and Testosterone deficiency.    No past surgical history on file.     Neurological History   Headaches/Migraines: Used to have migraines.   TBI: Denied   Seizures: Denied   Stroke: Denied   Tumor: Denied   Previous Episodes of Delirium: Denied during  clinical interview. Fluctuation in mentation that lasted for one day reported after clinical interview (See above)  Movement Disorder: Denied   CNS Infection: Denied   Other: Denied     Neurodiagnostics  MRI brain 7/9/2019:  FINDINGS:  Is the diffusion sequence is normal without evidence of acute ischemia or infarct.  GRE images demonstrate no significant blood products.  There is mild involutional change in mild microvascular small vessel ischemic change.  Pituitary craniocervical junction show nothing unusual.  All flow voids are present were expected.  There cataract implants.  There is small vessel ischemic change of the cerebellar hemispheres.  Sinuses are clear.  Skull and skull base show nothing unusual.      Impression       No acute process seen.    Involutional change and small vessel ischemic change.     Pertinent Lab Work  Lab Results   Component Value Date    EHEFECAX95 490 06/18/2019     Lab Results   Component Value Date    RPR Non-reactive 06/18/2019     No results found for: FOLATE  Lab Results   Component Value Date    TSH 2.506 06/18/2019     Lab Results   Component Value Date    HGBA1C 5.1 06/18/2019     No results found for: HIV1X2, [id number]    Psychiatric History  Prior Diagnoses: Depression and Anxiety   History of Trauma/Abuse: No abuse. Sister was rebellious and there was a lot of discomfort in the home because of it   History of Suicide Attempts: Denied attempts, but has had thoughts  Current Ideation, Intention, or Plan: No recent ideation  Homicidal Ideation: Denied   Medication(s): Buspar. Wife says 2 or 3 weeks ago depression and anxiety were pretty bad but since getting medication back on track, they are much improved  Hospitalization(s): None  Psychotherapy/Counseling: Once every 2 weeks for 3 months about a year ago     Substance Use History  Social History     Tobacco Use    Smoking status: Former Smoker    Smokeless tobacco: Former User   Substance and Sexual Activity     "Alcohol Use     Frequency: 2-3 times a week     Drinks per session: 1 or 2     Binge frequency: Less than monthly    Drug use: Not on file    Sexual activity: Not on file     Current Alcohol Use: His wife reports that over time, he has become more of a drinker. Consuming 3 or 4 beers a day and taking "swigs" of Fireball. She can tell that he is intoxicated. Will become confrontational when he drinks.   Current Tobacco Use: Quit smoking and dipping in 1994  Drug Use: None    Medications    Current Outpatient Medications:     amLODIPine (NORVASC) 5 MG tablet, Take 1 tablet (5 mg total) by mouth once daily., Disp: 90 tablet, Rfl: 3    busPIRone (BUSPAR) 10 MG tablet, Take 1 tablet (10 mg total) by mouth 2 (two) times daily., Disp: 180 tablet, Rfl: 3    carvedilol (COREG) 12.5 MG tablet, Take 1 tablet (12.5 mg total) by mouth 2 (two) times daily with meals., Disp: 180 tablet, Rfl: 3    DULoxetine (CYMBALTA) 60 MG capsule, Take 1 capsule (60 mg total) by mouth once daily., Disp: 90 capsule, Rfl: 3    ezetimibe (ZETIA) 10 mg tablet, Take 1 tablet (10 mg total) by mouth once daily., Disp: 90 tablet, Rfl: 3    finasteride (PROSCAR) 5 mg tablet, 5 mg., Disp: , Rfl: 1    hydroCHLOROthiazide (HYDRODIURIL) 25 MG tablet, Take 1 tablet (25 mg total) by mouth once daily., Disp: 90 tablet, Rfl: 3    ketoconazole (NIZORAL) 2 % cream, by intratympanic route., Disp: , Rfl:     levocetirizine (XYZAL) 5 MG tablet, Take 1 tablet by mouth., Disp: , Rfl:     losartan (COZAAR) 50 MG tablet, Take 2 tablets (100 mg total) by mouth Daily., Disp: 180 tablet, Rfl: 3    omeprazole (PRILOSEC) 20 MG capsule, Take 20 mg by mouth., Disp: , Rfl:     pregabalin (LYRICA) 75 MG capsule, Take 1 capsule (75 mg total) by mouth 2 (two) times daily., Disp: 180 capsule, Rfl: 1    testosterone cypionate (DEPOTESTOTERONE CYPIONATE) 200 mg/mL injection, Inject 300 mg into the muscle., Disp: , Rfl:     tiZANidine (ZANAFLEX) 4 MG tablet, Take 4 mg " by mouth., Disp: , Rfl:     triamcinolone acetonide 0.1% (KENALOG) 0.1 % ointment, by intratympanic route., Disp: , Rfl:     warfarin (COUMADIN) 5 MG tablet, TAKE ONLY AS DIRECTED BY COUMADIN CLINIC.  TAKE 7.5MG DAILY., Disp: , Rfl:     Family Neurological & Psychiatric History    No family history on file.  Neurologic: Maternal aunt had dementia (later in age when had thinking changes, 70 carmelo per his wife)  Psychiatric: Negative for heritable risk factors.    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 2 daughters   Support System: Good. Wife and does have a friend that he is close with   Hobbies: Woodworking  Current Living Situation: He and his wife     OBJECTIVE:     PROCEDURES/TESTS ADMINISTERED: In addition to performing a review of pertinent medical records, reviewing limits to confidentiality, conducting a clinical interview, and explaining procedures, the following measures were administered: [place] Instrumental Activities of Daily Living Scale (IADL); The Neuropsychiatric Inentory Questionnaire (NPI-Q); and Patient Health Questionnaire (PHQ-9); [place]-in-the-Hand Test; Test of Premorbid Functioning (TOPF); Wechsler Adult Intelligence Scale, Fourth Edition (WAIS-IV) [Digit Span, Symbol Search, and Block Design subtests]; Repeatable Battery for the Assessment of Neuropsychological Status (RBANS, form A); Neuropsychological Assessment Battery (NAB) [Naming subtest, form 1]; Verbal fluency tests (FAS & animal naming; Mirtha et al., 2004 norms); [place] Complex Figure Test (RCFT) [copy  "only]; Trail Making Test, parts A and B (Mirtha et al., 2004 norms); [place] Card Sorting Test (WCST-128), Geriatric Depression Scale (GDS-30); and Generalized Anxiety Disorder - 7 Item Scale (NANI-7). Manual norms were used unless otherwise indicated.      MENTAL STATUS AND OBSERVATIONS:   Appearance: Casually dressed and adequate grooming/hygiene.   Alertness: Attentive and alert.   Orientation: Oriented to person and place. He was unsure of the day of the month and stated the day of the week was "Tuesday" (Monday). He was oriented to year and month.   Gait: Unable to assess. No problems observed by the psychometrist during testing.   Motor movements/mannerisms: Unable to assess. No problems were observed by the psychometrist during testing.   Speech/language: Normal in rate, rhythm, tone, and volume. Mild word finding difficulty observed. Some difficulty comprehending the questions being asked. Would need questions repeated and at times, simplified in order to ensure comprehension.   Mood/Affect: The patients stated mood was "okay." Affect was congruent with stated mood. Tearful and upset at beginning of testing session. Regained composure and affect remained euthymic throughout the remainder of the evaluation.   Interpersonal Behavior: Rapport was quickly and easily established   Suicidality/Homicidality: Denied  Hallucinations/Delusions: None evidenced or endorsed  Thought Content & Processes:Thoughts seemed logical and goal-directed.   Insight & Judgment: Appropriate  Participation in Clinical Interview: Full but looked to his wife to help clarify information or provide additional details.     TEST TAKING BEHAVIOR AND VALIDITY: The patient was visibly upset and tearful upon arrival for testing session, stating that the ride to the hospital was stressful and that he and his wife got lost. He was able to regain composure and maintained his composure for the duration of the evaluation. He was disorganized in " his approach to tasks and perseverative when drawing a complex figure. He frequently forgot what he was doing as he was completing a task and needed to ask the examiner for clarification. He would also revert back to a previous task while working on a current task. Scores on stand-alone and embedded performance validity measures were within normal limits. The current results, therefore, are likely an accurate reflection of the patient's current functioning.    TESTS RESULTS    PHQ9 3/30/2020   Total Score 17       Raw Score Standardized Score Percentile/CP Descriptor   ACS RDS 8 - - -   CITH 10 - - -   RBANS Effort Index 0       PREMORBID FUNCTIONING Raw Score Standardized Score Percentile/CP Descriptor   TOPF simple dem. eFSIQ - 109 73 Average   TOPF pred. eFSIQ - 100 50 Average   TOPF simple + pred. eFSIQ - 105 63 Average   COGNITIVE SCREENING Raw Score Standardized Score Percentile/CP Descriptor   RBANS        Immediate Memory - 73 4 Below Average   VS/Construction - 109 73 Average   Language - 82 12 Low Average   Attention - 85 16 Low Average   Delayed Memory - 79 8 Below Average   Total Scale - 81 10 Low Average   Subtests        List Learning (4, 5, 5, & 5) 19 6 9 Low Average   Story Memory 8 4 2 Below Average   Figure Copy 20 14 91 Above Average   Line Orientation 16 - 26-50 Average   Naming 10 - 51-75 Average   Fluency 9 3 1 Exceptionally Low    Digit Span 9 8 25 Average   Coding 30 7 16 Low Average   List Recall 2 - 10-16 Low Average   List Recognition 19 - 26-50 Average   Story Recall 2 3 1 Exceptionally Low    Figure Recall 0 1 0 Exceptionally Low    Story Recognition  9 - 16-26 Low Average   Figure Recognition 1 - - -   LANGUAGE FUNCTIONING Raw Score Standardized Score Percentile/CP Descriptor   RBANS Naming 10 - 51-75 Average   RBANS Semantic Fluency 9 3 1 Exceptionally Low    TOPF Word Reading 38 98 45 Average   NAB Naming 30 56 73 Average   FAS 33 9 37 Average   Animal Naming 11 30 2 Below Average    VISUOSPATIAL FUNCTIONING Raw Score Standardized Score Percentile/CP Descriptor   RBANS Line Orientation  16 - 26-50 Average   RBANS Figure Copy 20 14 91 Above Average   WAIS-IV Block Design 20 7 16 Low Average   RCFT Copy 19 - <1 Exceptionally Low    RCFT Time to Copy 479 - 2-5 Low Average   LEARNING & MEMORY Raw Score Standardized Score Percentile/CP Descriptor   RBANS        Immediate Memory - 73 4 Below Average   Delayed Memory - 79 8 Below Average   List Learning (4, 5, 5, & 5) 19 6 9 Low Average   List Recall 2 - 10-16 Low Average   List Recognition 19 - 26-50 Average   Story Memory 8 4 2 Below Average   Story Recall 2 3 1 Exceptionally Low    Story Recognition  9 - 16-26 Low Average   Figure Recall 0 1 0.1 Exceptionally Low    Figure Recognition 1 - - -   ATTENTION/WORKING MEMORY Raw Score Standardized Score Percentile/CP Descriptor   WAIS-IV Digit Span 14 4 2 Below Average         DS Forward 6 5 5 Below Average         DS Backward 8 10 50 Average         DS Sequence 0 1 0.1 Exceptionally Low          Longest Digit Forward 4 - - -         Longest Digit Backward 4 - - -         Longest Digit Sequence 0 - - -   RBANS Digit Span  9 8 25 Average   MENTAL PROCESSING SPEED Raw Score Standardized Score Percentile/CP Descriptor   WAIS-IV Symbol Search 16 7 16 Low Average   RBANS Coding 30 7 16 Low Average   TMT A  51 38 12 Low Average   TMT A errors 0 - - -   EXECUTIVE FUNCTIONING Raw Score Standardized Score Percentile/CP Descriptor   TMT B DC       TMT B errors  - - -   WCST-128        Total Correct 58  - -   Total Errors 70 78 7 Below Average   Perseverative Resp. 39 87 19 Low Average   Perseverative Err. 30 89 23 Low Average   Nonperseverative Err. 40 67 1 Exceptionally Low    Concept. Level Response 25 75 5 Below Average   Categories Completed 0  <1 Exceptionally Low    FMS 2 - <1 Exceptionally Low    Learning to Learn N/A - >16 WNL   MOOD & PERSONALITY Raw Score Standardized Score Percentile/CP Descriptor    GDS-30 19 - - Mild   NANI-7 12 - - Moderate       BILLING  Service Description CPT Code Minutes Units   Psychiatric diagnostic evaluation by physician [id number] (previously billed)   Neurobehavioral status exam by physician [id number] (previously billed)   Each additional hour by physician [id number] (previously billed)   Test Evaluation Services --  --   Neuropsychological testing evaluation services by physician [phone]   Each additional hour by physician [id number]  3   Test Administration and Scoring --  --   Psychological or neuropsychological test administration and scoring by physician [id number]  0   Each additional 30 minutes by physician [id number]  0   Psychological or neuropsychological test administration and scoring by technician [phone]   Each additional 30 minutes by technician [id number]  6

## 2020-07-05 PROBLEM — F02.818 MAJOR NEUROCOGNITIVE DISORDER DUE TO MULTIPLE ETIOLOGIES WITH BEHAVIORAL DISTURBANCE: Status: ACTIVE | Noted: 2019-09-13

## 2020-07-05 PROBLEM — F43.23 ADJUSTMENT DISORDER WITH MIXED ANXIETY AND DEPRESSED MOOD: Status: ACTIVE | Noted: 2019-06-18

## 2020-07-06 NOTE — ASSESSMENT & PLAN NOTE
Compared to average range premorbid estimates, results of the current evaluation suggest significant declines/weaknesses in executive functioning, attention/working memory, learning, and visuospatial skills, with milder declines in mental processing speed and memory retrieval. His recall of previously learned information improved when he was provided with cues. His language skills were at expectation, with the exception of reduced semantic verbal fluency.    The deficits in cognitive functioning combined with his difficulty in IADL completion suggest that a diagnosis of Major Neurocognitive Disorder/dementia is appropriate, with a neurodegenerative condition thought likely.     Importantly, while the patient has had steady and gradual decline for a few years, he recently experienced a fluctuation in mental status without (to my knowledge) an underlying medical cause identified. This, combined with report of reduced physical functioning raises concern for a Lewy Body dementia process. Alzheimer's disease also remains in the differential given weaknesses in learning and memory (albeit he is still benefiting from recognition memory, indicating that he is not fully amnestic at this time). His cardiovascular risk factors and mild ischemic changes may be playing a small role in his cognitive dysfunction, as may his increased alcohol use and mood symptoms.     Re-evaluation in 6 to 12 months will be beneficial to continue to monitor cognition and refine etiology. Periodic check-ins based on patient's needs will be offered and scheduled accordingly.     Our [facility] team will be given his contact information to determine if he qualifies for care (and is interested in receiving care) through the research study. If not, our LCSW will reach out to him and his wife.     He has been unable to tolerate cognitive enhancing medications. Mood symptoms have started to improve with correct dosage of Cymbalta and Buspar. Continue  to monitor.     The patient continues to drive but does so in a limited manner. Given his current test scores, he is at increased risk for making errors while driving. He would likely benefit from undergoing a driving evaluation to help determine if he is indeed safe to do so.     This patients continued use of alcohol may contribute to current cognitive difficulties. Therefore, it is recommended that he or she stop drinking as this may lead to an improvement in thinking skills    A personal history of disorders that affect the cardiovascular system (e.g., hypertension, high cholesterol, diabetes, heart disease) can have a negative impact on brain functioning especially over many years. Therefore, it is very important for this patient to maintain good control over his risk factors. The following is recommended:  · Take all medications as prescribed and follow-up with recommendations above.  · Get regular physical exercise to the extent that it is possible.   · Eat a well-balanced diet and following the MIND diet (see handout) has been shown to be most brain protective.   · Check your blood pressure, cholesterol levels, blood sugar, and others as appropriate.    It is recommended that legal documents be put into place to allow others to make healthcare and financial decisions for this patient should he become unable to do so. He should consider designating a durable power of  for healthcare and finances, completing advanced directives for healthcare decisions, and estate planning (e.g., finalizing a will) at this time.     We recommend the book The 36-Hour Day: A Family Guide to Caring for Persons with Alzheimer Disease, Related Dementing Illnesses, and Memory Loss in Later Life by Aida Buenrostro and Christiano Emerson for practical advice to make caregiving easier and improve quality of life for both the patient and family.     The Alzheimers Association is a great resource for caregivers and patients who have all  types of dementia or memory loss (not just Alzheimers). Visit the Alzheimers Association website at http://www.alz.org to find chapters near you.

## 2020-07-16 ENCOUNTER — TELEPHONE (OUTPATIENT)
Dept: NEUROLOGY | Facility: CLINIC | Age: 70
End: 2020-07-16

## 2020-07-16 ENCOUNTER — OFFICE VISIT (OUTPATIENT)
Dept: NEUROLOGY | Facility: CLINIC | Age: 70
End: 2020-07-16
Payer: MEDICARE

## 2020-07-16 DIAGNOSIS — F02.818 MAJOR NEUROCOGNITIVE DISORDER DUE TO MULTIPLE ETIOLOGIES WITH BEHAVIORAL DISTURBANCE: ICD-10-CM

## 2020-07-16 DIAGNOSIS — F43.23 ADJUSTMENT DISORDER WITH MIXED ANXIETY AND DEPRESSED MOOD: ICD-10-CM

## 2020-07-16 PROCEDURE — 99499 NO LOS: ICD-10-PCS | Mod: 95,,, | Performed by: CLINICAL NEUROPSYCHOLOGIST

## 2020-07-16 PROCEDURE — 99499 UNLISTED E&M SERVICE: CPT | Mod: GT,95,, | Performed by: CLINICAL NEUROPSYCHOLOGIST

## 2020-07-16 RX ORDER — BUSPIRONE HYDROCHLORIDE 10 MG/1
20 TABLET ORAL 2 TIMES DAILY
Qty: 360 TABLET | Refills: 3 | Status: SHIPPED | OUTPATIENT
Start: 2020-07-16 | End: 2021-07-16

## 2020-07-16 NOTE — PROGRESS NOTES
NEUROPSYCHOLOGICAL EVALUATION FEEDBACK    TELEMEDICINE DETAILS:   The patient location is: home  The chief complaint leading to consultation is: feedback regarding neuropsychological evaluation results  Visit type: Virtual visit with synchronous audio and video  Total time spent with patient: 60 minutes  Each patient to whom he or she provides medical services by telemedicine is: (1) informed of the relationship between the physician and patient and the respective role of any other health care provider with respect to management of the patient; and (2) notified that he or she may decline to receive medical services by telemedicine and may withdraw from such care at any time.  Notes: Blane Kim attended a feedback session today and was accompanied by his wife.  We discussed the results of the neuropsychological evaluation and I gave time to discuss questions and concerns.    Fariha Davis, PhD  Licensed Clinical Neuropsychologist  [facility] - Department of Neurology

## 2020-07-16 NOTE — TELEPHONE ENCOUNTER
LVM regarding change in medication dosage and times to take it. Will call back with any questions or concerns.

## 2020-07-16 NOTE — TELEPHONE ENCOUNTER
----- Message from Izaiah Ansari MD sent at 7/16/2020  2:32 PM CDT -----  Nataliya,  Could you let then know I think it would be a good idea to increase buspar to 20mg twice daily to help with his depression while he waits to see psychiatry?  Revised prescription has been sent to his pharmacy.  CV  ----- Message -----  From: Fariha Davis, PhD  Sent: 7/16/2020  12:39 PM CDT  To: MD Tim Bhardwaj,     This patient appeared pretty depressed during our feedback session just now, and his wife shared that she has become more concerned about his mood over the past month. Would you be willing to take a look at his psych meds to see if they could be increased/adjusted? I referred him to [facility] as well, but just as an interim fix to maybe get some relief...     Also, did you get a sense of any parkinsonism with him? He had a fluctuation in mentation for one day that did not have an underlying metabolic cause. Just curious if he has an emerging LBD. His cognitive test results are suggestive of an emerging neurodegenerative condition, although this was a little tricky to tease apart presently. We are going to stay involved in his care though.     Thanks - Madiha

## 2020-08-11 ENCOUNTER — PATIENT OUTREACH (OUTPATIENT)
Dept: OTHER | Facility: OTHER | Age: 70
End: 2020-08-11

## 2020-08-14 ENCOUNTER — OFFICE VISIT (OUTPATIENT)
Dept: INTERNAL MEDICINE | Facility: CLINIC | Age: 70
End: 2020-08-14
Payer: MEDICARE

## 2020-08-14 DIAGNOSIS — D64.9 ANEMIA, UNSPECIFIED TYPE: ICD-10-CM

## 2020-08-14 DIAGNOSIS — E34.9 TESTOSTERONE DEFICIENCY: ICD-10-CM

## 2020-08-14 DIAGNOSIS — R60.0 LOCALIZED EDEMA: ICD-10-CM

## 2020-08-14 DIAGNOSIS — R06.02 CHRONIC SHORTNESS OF BREATH: ICD-10-CM

## 2020-08-14 DIAGNOSIS — I95.9 HYPOTENSION, UNSPECIFIED HYPOTENSION TYPE: ICD-10-CM

## 2020-08-14 DIAGNOSIS — M79.89 SWELLING OF LOWER EXTREMITY: ICD-10-CM

## 2020-08-14 PROCEDURE — 99442 PR PHYSICIAN TELEPHONE EVALUATION 11-20 MIN: CPT | Mod: 95,,, | Performed by: FAMILY MEDICINE

## 2020-08-14 PROCEDURE — 99442 PR PHYSICIAN TELEPHONE EVALUATION 11-20 MIN: ICD-10-PCS | Mod: 95,,, | Performed by: FAMILY MEDICINE

## 2020-08-14 NOTE — PROGRESS NOTES
"Established Patient - Audio Only Telehealth Visit     The patient location is: home in [place], [place]  The chief complaint leading to consultation is: Hypotension and anemia  Visit type: Virtual visit with audio only (telephone)  Total time spent with patient: 15 minutes       The reason for the audio only service rather than synchronous audio and video virtual visit was related to technical difficulties or patient preference/necessity.     Each patient to whom I provide medical services by telemedicine is:  (1) informed of the relationship between the physician and patient and the respective role of any other health care provider with respect to management of the patient; and (2) notified that they may decline to receive medical services by telemedicine and may withdraw from such care at any time. Patient verbally consented to receive this service via voice-only telephone call.     HPI: Blane Kim is a 69 year old male with anxiety/depression, atrial fibrillation, BPH, dementia, hyperlipidemia, hypertension who presents to clinic today for follow up on hypertension and anemia.    Had recent CBC 8/6/20 showing RBC 4.0, H/H 13.6/38.8 with normal MCV.    HTN:  Prescribed amlodipine 5 mg by mouth daily, carvedilol 12.5 mg by mouth twice daily, HCTZ 25 mg by mouth daily, and losartan 50 mg 2 tablets by mouth daily.    125/71.    States he saw his urologist on 8/6/20 and BP was 90/60.  States his urologist discontinued the losartan.  Still taking amlodipine, carvedilol, and HCTZ.  States he is monitoring his home BP values; states the last time he checked it it was "moderately higher than what it was" but does not have the value.      Testosterone was very low as well.      States he has frequent swelling in his ankles and calves.  Intermittent issue.  States this began over this past weekend.  Symptoms stable.  States it feels martha muscle pain in the calves as well.  Endorses associated SOB "most of the time." and " "states it feels like he has to breath heavy.  States the SOB has been present "probably since the last time I saw you" then says "before that too."  Wife states patient has had SOB since mitral valve replacement in 2005.  Has been evaluated by his cardiologist; has appt scheduled in Sept with cardiology.     Assessment and plan:      1.  Anemia, unspecified type       -     Check iron studies, retic count, FOBt now.  CBC 3 months to monitor.    2.  Chronic shortness of breath       -     Check CXR, BNP now.  Recommended immediate medical evaluation through nearest emergency department for any severe shortness of breath, difficulty breathing or development of associated CP.  Etiology unclear at this time.  Wife states patient has complained of SOB since 2005 s/p MVR.  Recommended he follow up with cardiology as well.    3.  Hypotension, unspecified hypotension type       -     Improved; reports BP noted to 125/71.  Continue current regimen at present.  Recommended he notify me for any BP < 90/60.  Recommended he monitor BP daily at home.    4.  Localized edema; swelling of lower extremity       -     Check venous US bilaterally and BNP.    5.  Testosterone deficiency       -     To be managed per urology.                        This service was not originating from a related E/M service provided within the previous 7 days nor will  to an E/M service or procedure within the next 24 hours or my soonest available appointment.  Prevailing standard of care was able to be met in this audio-only visit.        "

## 2020-08-19 ENCOUNTER — HOSPITAL ENCOUNTER (OUTPATIENT)
Dept: CARDIOLOGY | Facility: HOSPITAL | Age: 70
Discharge: HOME | End: 2020-08-19
Attending: FAMILY MEDICINE
Payer: MEDICARE

## 2020-08-19 ENCOUNTER — HOSPITAL ENCOUNTER (OUTPATIENT)
Dept: RADIOLOGY | Facility: HOSPITAL | Age: 70
Discharge: HOME | End: 2020-08-19
Attending: FAMILY MEDICINE
Payer: MEDICARE

## 2020-08-19 DIAGNOSIS — R60.0 LOCALIZED EDEMA: ICD-10-CM

## 2020-08-19 DIAGNOSIS — R06.02 CHRONIC SHORTNESS OF BREATH: ICD-10-CM

## 2020-08-19 DIAGNOSIS — M79.89 SWELLING OF LOWER EXTREMITY: ICD-10-CM

## 2020-08-19 PROCEDURE — 93970 EXTREMITY STUDY: CPT | Mod: 50

## 2020-08-19 PROCEDURE — 93970 CV US DOPPLER VENOUS LEGS BILATERAL (CUPID ONLY): ICD-10-PCS | Mod: 26,,, | Performed by: INTERNAL MEDICINE

## 2020-08-19 PROCEDURE — 71046 X-RAY EXAM CHEST 2 VIEWS: CPT | Mod: TC

## 2020-08-19 PROCEDURE — 71046 X-RAY EXAM CHEST 2 VIEWS: CPT | Mod: 26,,, | Performed by: RADIOLOGY

## 2020-08-19 PROCEDURE — 71046 XR CHEST PA AND LATERAL: ICD-10-PCS | Mod: 26,,, | Performed by: RADIOLOGY

## 2020-08-19 PROCEDURE — 93970 EXTREMITY STUDY: CPT | Mod: 26,,, | Performed by: INTERNAL MEDICINE

## 2020-08-25 ENCOUNTER — TELEPHONE (OUTPATIENT)
Dept: INTERNAL MEDICINE | Facility: CLINIC | Age: 70
End: 2020-08-25

## 2020-08-25 DIAGNOSIS — D53.9 MACROCYTIC ANEMIA: Primary | ICD-10-CM

## 2020-08-25 NOTE — TELEPHONE ENCOUNTER
Vitamin B12 and folate levels now.  CBC in 3 months.  Please process/notify pt once complete (may want to check with patient about what lab to send to as they live in [place]).  Explanation sent via portal.

## 2020-08-26 ENCOUNTER — LAB VISIT (OUTPATIENT)
Dept: LAB | Facility: HOSPITAL | Age: 70
End: 2020-08-26
Attending: FAMILY MEDICINE
Payer: MEDICARE

## 2020-08-26 DIAGNOSIS — D53.9 MACROCYTIC ANEMIA: ICD-10-CM

## 2020-08-26 LAB
BASOPHILS # BLD AUTO: 0.01 K/UL (ref 0–0.2)
BASOPHILS NFR BLD: 0.1 % (ref 0–1.9)
DIFFERENTIAL METHOD: ABNORMAL
EOSINOPHIL # BLD AUTO: 0 K/UL (ref 0–0.5)
EOSINOPHIL NFR BLD: 0 % (ref 0–8)
ERYTHROCYTE [DISTWIDTH] IN BLOOD BY AUTOMATED COUNT: 15.7 % (ref 11.5–14.5)
FOLATE SERPL-MCNC: 14.6 NG/ML (ref 4–24)
HCT VFR BLD AUTO: 45.1 % (ref 40–54)
HGB BLD-MCNC: 14.3 G/DL (ref 14–18)
IMM GRANULOCYTES # BLD AUTO: 0.06 K/UL (ref 0–0.04)
IMM GRANULOCYTES NFR BLD AUTO: 0.5 % (ref 0–0.5)
LYMPHOCYTES # BLD AUTO: 0.7 K/UL (ref 1–4.8)
LYMPHOCYTES NFR BLD: 6.3 % (ref 18–48)
MCH RBC QN AUTO: 32.9 PG (ref 27–31)
MCHC RBC AUTO-ENTMCNC: 31.7 G/DL (ref 32–36)
MCV RBC AUTO: 104 FL (ref 82–98)
MONOCYTES # BLD AUTO: 0.9 K/UL (ref 0.3–1)
MONOCYTES NFR BLD: 7.8 % (ref 4–15)
NEUTROPHILS # BLD AUTO: 9.7 K/UL (ref 1.8–7.7)
NEUTROPHILS NFR BLD: 85.3 % (ref 38–73)
NRBC BLD-RTO: 0 /100 WBC
PLATELET # BLD AUTO: 246 K/UL (ref 150–350)
PMV BLD AUTO: 11.5 FL (ref 9.2–12.9)
RBC # BLD AUTO: 4.35 M/UL (ref 4.6–6.2)
VIT B12 SERPL-MCNC: 1240 PG/ML (ref 210–950)
WBC # BLD AUTO: 11.34 K/UL (ref 3.9–12.7)

## 2020-08-26 PROCEDURE — 85025 COMPLETE CBC W/AUTO DIFF WBC: CPT

## 2020-08-26 PROCEDURE — 36415 COLL VENOUS BLD VENIPUNCTURE: CPT | Mod: PO

## 2020-08-26 PROCEDURE — 82607 VITAMIN B-12: CPT

## 2020-08-26 PROCEDURE — 82746 ASSAY OF FOLIC ACID SERUM: CPT

## 2020-08-27 ENCOUNTER — TELEPHONE (OUTPATIENT)
Dept: INTERNAL MEDICINE | Facility: CLINIC | Age: 70
End: 2020-08-27

## 2020-08-27 DIAGNOSIS — D64.9 ANEMIA, UNSPECIFIED TYPE: Primary | ICD-10-CM

## 2020-09-06 ENCOUNTER — PATIENT MESSAGE (OUTPATIENT)
Dept: OTHER | Facility: OTHER | Age: 70
End: 2020-09-06

## 2020-09-08 ENCOUNTER — PATIENT MESSAGE (OUTPATIENT)
Dept: OTHER | Facility: OTHER | Age: 70
End: 2020-09-08

## 2020-09-08 NOTE — PROGRESS NOTES
Digital Medicine: Health  Follow-Up    The history is provided by the patient.             Reason for review: Blood pressure at goal        Topics Covered on Call: physical activity and Diet    Additional Follow-up details: Patient reports doing very well.   He was able to get his device working. AVG /68. Denies changes in diet or exercise routine.    He states that he has too much time on his hands now, not working.He is looking forward to being able to get out of the house.        Diet-no change to diet    No change to diet.      Intervention(s): DASH diet      Physical Activity-no change to routine  No change to exercise routine.       Additional physical activity details: Stays active around the house throughout the day.      Medication Adherence-Medication Adherence not addressed.      Substance, Sleep, Stress-Not assessed      Continue current diet/physical activity routine.       Addressed any questions or concerns and patient has my contact information if needed prior to next outreach. Patient verbalizes understanding.      Explained the importance of self-monitoring and medication adherence. Encouraged the patient to communicate with their health  for lifestyle modifications to help improve or maintain a healthy lifestyle.                Topic    Lipid (Cholesterol) Test

## 2020-09-10 ENCOUNTER — TELEPHONE (OUTPATIENT)
Dept: NEUROLOGY | Facility: CLINIC | Age: 70
End: 2020-09-10

## 2020-09-10 NOTE — TELEPHONE ENCOUNTER
----- Message from Cash Rivera sent at 9/10/2020  9:47 AM CDT -----  Contact: Mrs. Kim (wife) @ [phone]  Mrs. Kim is asking the doctor pls fill pregablin at pharmacy below. Mrs. Kim is asking to speak w/ the nurse regarding this.    [facility] [address] [place] [address] [address] [address]  [address] [address]  Phone: [phone] Fax: [phone]

## 2020-09-11 DIAGNOSIS — G62.9 PERIPHERAL POLYNEUROPATHY: Primary | Chronic | ICD-10-CM

## 2020-09-11 RX ORDER — PREGABALIN 100 MG/1
100 CAPSULE ORAL 2 TIMES DAILY
Qty: 180 CAPSULE | Refills: 1 | Status: SHIPPED | OUTPATIENT
Start: 2020-09-11 | End: 2020-10-01

## 2020-10-01 ENCOUNTER — OFFICE VISIT (OUTPATIENT)
Dept: NEUROLOGY | Facility: CLINIC | Age: 70
End: 2020-10-01
Payer: MEDICARE

## 2020-10-01 VITALS
SYSTOLIC BLOOD PRESSURE: 134 MMHG | BODY MASS INDEX: 27.3 KG/M2 | HEIGHT: 73 IN | DIASTOLIC BLOOD PRESSURE: 83 MMHG | HEART RATE: 69 BPM | WEIGHT: 206 LBS

## 2020-10-01 DIAGNOSIS — F02.818 MAJOR NEUROCOGNITIVE DISORDER DUE TO MULTIPLE ETIOLOGIES WITH BEHAVIORAL DISTURBANCE: Primary | ICD-10-CM

## 2020-10-01 DIAGNOSIS — G62.9 PERIPHERAL POLYNEUROPATHY: Chronic | ICD-10-CM

## 2020-10-01 PROCEDURE — 99999 PR PBB SHADOW E&M-EST. PATIENT-LVL IV: CPT | Mod: PBBFAC,,, | Performed by: PSYCHIATRY & NEUROLOGY

## 2020-10-01 PROCEDURE — 99214 OFFICE O/P EST MOD 30 MIN: CPT | Mod: S$PBB,,, | Performed by: PSYCHIATRY & NEUROLOGY

## 2020-10-01 PROCEDURE — 99214 PR OFFICE/OUTPT VISIT, EST, LEVL IV, 30-39 MIN: ICD-10-PCS | Mod: S$PBB,,, | Performed by: PSYCHIATRY & NEUROLOGY

## 2020-10-01 PROCEDURE — 99214 OFFICE O/P EST MOD 30 MIN: CPT | Mod: PBBFAC | Performed by: PSYCHIATRY & NEUROLOGY

## 2020-10-01 PROCEDURE — 99999 PR PBB SHADOW E&M-EST. PATIENT-LVL IV: ICD-10-PCS | Mod: PBBFAC,,, | Performed by: PSYCHIATRY & NEUROLOGY

## 2020-10-01 RX ORDER — DOXEPIN HYDROCHLORIDE 50 MG/G
CREAM TOPICAL 2 TIMES DAILY PRN
Qty: 45 G | Refills: 11 | Status: SHIPPED | OUTPATIENT
Start: 2020-10-01

## 2020-10-01 RX ORDER — PREGABALIN 150 MG/1
150 CAPSULE ORAL 2 TIMES DAILY
Qty: 180 CAPSULE | Refills: 3 | Status: SHIPPED | OUTPATIENT
Start: 2020-10-01 | End: 2021-04-01

## 2020-10-01 NOTE — PATIENT INSTRUCTIONS
INCREASE LYRICA TO 150MG TWICE DAILY    USE DOXEPIN CREAM AS NEEDED ON FEET AND CALVES FOR NEUROPATHY PAIN

## 2020-10-01 NOTE — PROGRESS NOTES
[facility]  CESAR FERNANDO - NEUROLOGY  [facility] [place]    Date: October 1, 2020   Patient Name: Blane Kim   MRN: [id number]   PCP: Vicente Mckay  Referring Provider: No ref. provider found    Assessment:      This is Blane Kim, 70 y.o. male with atrial fibrillation, HTN, ongoing neuropathic pain despite compliance with B6 supplement with mild Alzheimer dementia by neuropsychology testing 2020.  He has been unable to tolerate 2 ACEI as well as namenda due to GI upset.  Mobility decline related in large part to deconditioning in setting of pain from orthopedic issues.     Plan:      -  Increase ->150mg bid  -  Doxepin cream  -  Continue cymbalta 60mg/d and buspar 20mg bid  -  B6 supplement    Follow up 6 months     I discussed side effects of the medications. I asked the patient to stop the medication if he notices serious adverse effects as we discussed and to seek immediate medical attention at an ER.     Izaiah Ansari MD  [facility]   Department of Neurology    Subjective:   -  Presents with wife with condition overall stable, had worsened irritability with buspar wean and resumed this as well as PGB with noted effect   -  Occasional mild situational confusion, will be in his yard at home and suddenly forget where he is and need to go inside to have wife orient him.  Wife also notes he will frequently become obsessive over whether or not they are taking the right route when driving and will assume that objects he has misplaced have been stolen.  -  Ongoing difficulty with diffuse pain, had hematoma develop in right knee following injection 5-6 weeks ago, sees orthopedist who has told him he need knee replacement but does not want to do it as he is on coumadin.  -  No issues with sleep     3/2020  -  Weaned off buspar with increased irritability, anxiety, and memory difficulties.  Wife notes instances in which he was in [place] and could not recall  "what state he was in, this can be more prominent in social situations such as restaurants where he may have difficulty with ordering.  Notes difficulty carrying through planning out woodworking projects.  He resumed buspar about 10 days ago with tolerable sedation and some improvement in anxiety noted.  -  Frequent night time awakenings to urinate although early morning awakenings as well  -  Difficulty with neuropathy pain  2020  -  Occasional difficulty getting his bearings while driving but overall memory and cognitive function remain stable since last visit  -  Concerns over decline in mobility over the past 1-2 years, endorses loss of mm mass and reports he is unable to walk a mile and was not able to do so a year ago, difficulties are worse after prolonged sitting  -  No improvement in foot pain with cymbalta, remote trial of GBP unhelpful  -  Some LE edema since starting norvasc    HPI 2019:   Mr. Blane Kim is a 70 y.o. 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  He remains active working in his wood shop and has been able to complete tasks such as repairing power tools.  He was recommended to stop driving on prior visit and has restricted driving to near his home without incident.  He notes some recent difficulty with memory such as not recalling knowing an acquaintance from [place] after they passes away.    -  Diarrhea improved since coming off galantamine and namenda  -  He reports ongoing difficulty with anxiety but on questioning this seems more like situational frustration with things such as dealing with grandchildren, wife's driving, or figuring out how to repair tools.    -  Ongoing burning feeling in feet which is bothersome but does not interfere with activities.    Per Dr. Gant 2019:  "70 yo M with HTN, HLD, h/o afib s/p MAZE who presents for " "evaluation of his memory loss, neuropathy, and "fibromyalgia."  His wife is most concerned about his memory.  He reports having had difficulty with his memory for about a year, which he describes as losing his coffee mug and not finding it until the next day, forgetting the names of people he's been friends with for decades, and driving down the street and wondering when they did all that construction (but really, it's the same as it's always been).  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  His wife does most of the shopping, though he attempts sometimes - though it takes about two hours for him to do a 'quick' shopping .  Severe diarrhea x2 years, since starting memory meds (Aricept, now DCd); currently on Namenda + Galantamine.  Wife does not think that he has had brain imaging before.  It sounds as though he has had neuropsych testing x2, but no results are available at this visit.  Some difficulty with walking, attributed to joint pain/prior trauma (offered surgery for ACL, meniscus, but declined).     Burning pain, numbness, tingling to just proximal to knees bilaterally and BUE to level of shoulders xyears.     Pains in multiple joints.    Per Neurologist Dr. Nova 2018  Neurobehavioral/cognitive exam (45+ minutes): Digit span 5 numbers forward. No focal inattention on a cancellation test. Able to repeat sentences while simple commands crossed commands although he erred on syntactically complex question. On the [place] Naming Test-3 scored 15/15 correct. On fluency produced 11 exemplars for the letter S and subsequently 13 exemplars for the category animals. He could not get into set for motor sequencing. On bimanual crossed response testing greater than 50% errors. Triple loops were relatively accurate although there was a single error on " "alternating design. Ideomotor praxis 3/3 accurate gestures. On Hus verbal learning scored 12 on free recall. I recognition he had 9 true positives correct with 1 related false positive error. Miscopied three-dimensional figure. On Sánchez visual organization 1/3 correct. 2 elements of clock drawing were accurate. Overall score the neurobehavioral exam was 17/30, frontal index = 0.41"    PAST MEDICAL HISTORY:  Past Medical History:   Diagnosis Date    Anxiety and depression     BPH (benign prostatic hyperplasia)     Dementia     GERD (gastroesophageal reflux disease)     H/O mitral valve replacement     Hyperlipidemia     Hypertension     Peripheral neuropathy 6/19/2019    Testosterone deficiency        PAST SURGICAL HISTORY:  History reviewed. No pertinent surgical history.    CURRENT MEDS:  Current Outpatient Medications   Medication Sig Dispense Refill    amLODIPine (NORVASC) 5 MG tablet Take 1 tablet (5 mg total) by mouth once daily. 90 tablet 3    busPIRone (BUSPAR) 10 MG tablet Take 2 tablets (20 mg total) by mouth 2 (two) times daily. 360 tablet 3    carvedilol (COREG) 12.5 MG tablet Take 1 tablet (12.5 mg total) by mouth 2 (two) times daily with meals. 180 tablet 3    DULoxetine (CYMBALTA) 60 MG capsule Take 1 capsule (60 mg total) by mouth once daily. 90 capsule 3    ezetimibe (ZETIA) 10 mg tablet Take 1 tablet (10 mg total) by mouth once daily. 90 tablet 3    finasteride (PROSCAR) 5 mg tablet 5 mg.  1    hydroCHLOROthiazide (HYDRODIURIL) 25 MG tablet Take 1 tablet (25 mg total) by mouth once daily. 90 tablet 3    levocetirizine (XYZAL) 5 MG tablet Take 1 tablet by mouth.      omeprazole (PRILOSEC) 20 MG capsule Take 20 mg by mouth.      tiZANidine (ZANAFLEX) 4 MG tablet Take 4 mg by mouth.      warfarin (COUMADIN) 5 MG tablet TAKE ONLY AS DIRECTED BY COUMADIN CLINIC.  TAKE 7.5MG DAILY.      doxepin (ZONALON) 5 % cream Apply topically 2 (two) times daily as needed. 45 g 11    " "pregabalin (LYRICA) 150 MG capsule Take 1 capsule (150 mg total) by mouth 2 (two) times daily. 180 capsule 3    testosterone cypionate (DEPOTESTOTERONE CYPIONATE) 200 mg/mL injection Inject 300 mg into the muscle.       No current facility-administered medications for this visit.        ALLERGIES:  Review of patient's allergies indicates:   Allergen Reactions    Statins-hmg-coa reductase inhibitors Other (See Comments)     Muscle pains/myalgias  Specifically to Lipitor.  causes joints aches    Latex Rash     Can the patient chew gum without allergic reation? Yes  Can the patient blow up a balloon without reaction? Yes  Is the patient allergic to kiwi, bananas, avocado, or chestnuts? No  Immediate itch or reaction to latex gloves? No  Known allergy to latex (i.e. blood test)? Yes  Does the patient require special undergarments, such as panties/briefs with special waist band?yes       FAMILY HISTORY:  History reviewed. No pertinent family history.    SOCIAL HISTORY:  Social History     Tobacco Use    Smoking status: Former Smoker    Smokeless tobacco: Former User   Substance Use Topics    Alcohol Use     Frequency: 2-3 times a week     Drinks per session: 1 or 2     Binge frequency: Less than monthly    Drug use: Not on file       Review of Systems:  12 review of systems is negative except for the symptoms mentioned in HPI.        Objective:     Vitals:    10/01/20 1307   BP: 134/83   Pulse: 69   Weight: 93.4 kg (206 lb)   Height: 6' 1" (1.854 m)       General: NAD, well nourished   Eyes: no tearing, discharge, no erythema   ENT: moist mucous membranes of the oral cavity, nares patent    Neck: Supple, full range of motion  Cardiovascular: Warm and well perfused, pulses equal and symmetrical  Lungs: Normal work of breathing, normal chest wall excursions  Skin: No rash, lesions, or breakdown on exposed skin  Psychiatry: Mood and affect are appropriate   Abdomen: soft, non tender, non distended  Extremeties: No " cyanosis, clubbing or edema.    Neurological   MENTAL STATUS: Alert and oriented to person, place, and time. Speech without dysarthria, able to name and repeat without difficulty.   CRANIAL NERVES: Visual fields intact. PERRL. EOMI. Facial sensation intact. Face symmetrical. Hearing grossly intact. Full shoulder shrug bilaterally. Tongue protrudes midline   SENSORY: Sensation is intact to light touch and vibration throughout.    MOTOR: Normal bulk and tone. No pronator drift.    CEREBELLAR/COORDINATION/GAIT: Push to stand, gait with somewhat broad base and mild unsteadiness on turns.

## 2020-10-04 ENCOUNTER — PATIENT MESSAGE (OUTPATIENT)
Dept: ADMINISTRATIVE | Facility: OTHER | Age: 70
End: 2020-10-04

## 2020-10-05 ENCOUNTER — OFFICE VISIT (OUTPATIENT)
Dept: NEUROLOGY | Facility: CLINIC | Age: 70
End: 2020-10-05
Payer: MEDICARE

## 2020-10-05 DIAGNOSIS — F43.23 ADJUSTMENT DISORDER WITH MIXED ANXIETY AND DEPRESSED MOOD: ICD-10-CM

## 2020-10-05 DIAGNOSIS — F02.818 MAJOR NEUROCOGNITIVE DISORDER DUE TO MULTIPLE ETIOLOGIES WITH BEHAVIORAL DISTURBANCE: Primary | ICD-10-CM

## 2020-10-05 PROCEDURE — 96116 NUBHVL XM PHYS/QHP 1ST HR: CPT | Mod: 95,,, | Performed by: CLINICAL NEUROPSYCHOLOGIST

## 2020-10-05 PROCEDURE — 99499 UNLISTED E&M SERVICE: CPT | Mod: 95,,, | Performed by: CLINICAL NEUROPSYCHOLOGIST

## 2020-10-05 PROCEDURE — 96116 PR NEUROBEHAVIORAL STATUS EXAM BY PSYCH/PHYS: ICD-10-PCS | Mod: 95,,, | Performed by: CLINICAL NEUROPSYCHOLOGIST

## 2020-10-05 PROCEDURE — 99499 NO LOS: ICD-10-PCS | Mod: 95,,, | Performed by: CLINICAL NEUROPSYCHOLOGIST

## 2020-10-05 NOTE — PROGRESS NOTES
NEUROPSYCHOLOGICAL EVALUATION - CONFIDENTIAL  FOLLOW-UP APPOINTMENT       Referring Provider: Fariha Davis, PhD & Izaiah Ansari MD  Medical Necessity: Evaluate cognitive functioning, treatment planning/management, and supportive therapy in the setting of dementia   Date Conducted: 10/5/2020  Present At Visit: Patient and his wife, An Diego: [id number] = 50 minutes  Consent: The patient expressed an understanding of the purpose of the evaluation and consented to all procedures. He additionally provided consent to speak with wife, who was present during the clinical interview. I reviewed the limits of confidentiality and we discussed an emergency plan.    ASSESSMENT & PLAN:     Mr. Blane Kim is an 70 y.o., White male with his bachelors degree who was referred for a neuropsychological evaluation in the setting of dementia.      Problem List Items Addressed This Visit        Neuro    Major neurocognitive disorder due to multiple etiologies with behavioral disturbance - Primary    Overview     MOCA  on 2019  Severe diarrhea with memory medications         Current Assessment & Plan     Continue with current treatment plan. No new safety concerns to address.     Spent time discussing the importance of minimizing items in his shed to allow him more autonomy in completing projects.     Discussed that they are ready to talk with our , Aida. Will have her reach out.     The patient and his wife would like to check in right after the holidays. Appointment scheduled for Monday, 2021 at 10:00 AM.             Psychiatric    Adjustment disorder with mixed anxiety and depressed mood    Overview     Prescribed Buspar and Cymbalta           If you have any questions, please contact me at [phone].      Fariah Davis, PhD  Licensed Clinical Neuropsychologist  [facility] - Department of Neurology    CLINICAL INTERVIEW & RECORD REVIEW     Mr. Blane Franco  "Julio is an 70 y.o., White male with his bachelors degree who was originally referred for a neuropsychological evaluation in the setting of thinking changes. He was seen for a neuropsychological evaluation on 3/30/2020 and 7/1/2020 and diagnosed with Major Neurocognitive Disorder due to multiple etiologies, including microvascular ischemic changes, increased alcohol use, and mood difficulties, with strong suspicion for an emerging neurodegenerative condition (AD vs. DLB given fluctuation in mentation at time of evaluation). Please see note dated 7/1/2020 from this provider for full details. He and his wife return for a check-in appointment.     Today's visit:   Nothing new or different per patient. Wife says no new developments.     Cognition   Still losing things consistently everyday. Huge shop with so much equipment and so many tools that won't be able to find what he is looking for. Won't recognize places when traveling. Memory - is really the issues and it appears more short-term rather than long-term. Doesn't remember things from even the beginning of their marriage. Memory medicines tried but not working. Too many tools can't keep track on them.     Neuropsychiatric  Mood - "It's been better" since last appointment. Not as upset with thinking changes.  Sister in law in the house right now for a visit and his mood has been brighter.   Hallucinations - recently not in the way that I had before. I do think sometimes "where am I?" Sometimes he can't get it out. Are we the only two people here? Well I thought surely someone else was here. Relates it all back to his mother. Feeling like his mother is present. It makes him think she is there. Or there is somebody. I don't see her. Makes him think about his mom and dad. Doesn't put him in a different mood.   Paranoia - Does get paranoid more now.   Disinhibition - None  Irritability - Improve  Impulsive/Compulsive Behaviors - Collecting more and more. - took " "neighbors wood pile but no room and no way to get    IADLs   Has pretty much D/C driving. Wife says awful back seat .     Other   Sleeping - terrible. Got a new mattress but it's uncomfortable.   Pain is bothering him. Dr. Ansari increased the Lyrica last week to 150 BID - not sure if that is going to help yet. Doxepin cream  - insurance did not cover it. Pharmacy was going to call Dr. Ansari to check. Gets outside and walks around. Doesn't go down the street because of the pain. Hard to get up. Hands sometimes, they hurt even. Body feels stiff after sitting a long time. His whole body hurts from the neck down. "I got a recliner out there, don't I?"  Appetite - no good. I don't eat a lot. In the morning I eat a lot. But he eats when a meal is prepared.   Alcohol Use - drinking less.     OBJECTIVE:     PROCEDURES/TESTS ADMINISTERED: Mental status examination, supportive therapy, and treatment planning.       MENTAL STATUS AND OBSERVATIONS:   Appearance: Casually dressed and adequate grooming/hygiene.   Alertness: Attentive and alert.   Orientation: Oriented to person and place. He was unsure of the day of the month and stated the day of the week was "Tuesday" (Monday). He was oriented to year and month.   Gait: Unable to assess.   Motor movements/mannerisms: Unable to assess.   Speech/language: Normal in rate, rhythm, tone, and volume. Mild word finding difficulty observed. Comprehension was intact.   Mood/Affect: The patients stated mood was "okay." Affect was congruent with stated mood.   Interpersonal Behavior: Rapport was quickly and easily established   Suicidality/Homicidality: Denied  Hallucinations/Delusions: None evidenced or endorsed  Thought Content & Processes:Thoughts seemed logical and goal-directed.   Insight & Judgment: Appropriate  Participation in Clinical Interview: Full but looked to his wife to help clarify information or provide additional details.       "

## 2020-10-06 ENCOUNTER — OUTPATIENT CASE MANAGEMENT (OUTPATIENT)
Dept: NEUROLOGY | Facility: CLINIC | Age: 70
End: 2020-10-06

## 2020-10-09 ENCOUNTER — PATIENT OUTREACH (OUTPATIENT)
Dept: OTHER | Facility: OTHER | Age: 70
End: 2020-10-09

## 2020-10-14 ENCOUNTER — TELEPHONE (OUTPATIENT)
Dept: NEUROLOGY | Facility: CLINIC | Age: 70
End: 2020-10-14

## 2020-10-20 ENCOUNTER — TELEPHONE (OUTPATIENT)
Dept: NEUROLOGY | Facility: CLINIC | Age: 70
End: 2020-10-20

## 2020-10-20 NOTE — TELEPHONE ENCOUNTER
Spoke with Mario Alberto, with [facility], and answered numerous questions regarding pt to submit an appeal for coverage of Doxepin cream for pt.

## 2020-10-20 NOTE — TELEPHONE ENCOUNTER
Please advise: pt had lithotripsy today and was told to ask cardiology when to resume Xaralto   Spoke with Mario Alberto obrien/[facility] who had further questions from their medical provider to ask regarding a PA for pt's doxepin cream.

## 2020-10-20 NOTE — TELEPHONE ENCOUNTER
----- Message from Gregory Akhtar sent at 10/20/2020 11:23 AM CDT -----  [facility] in office she said she called earlier for this pt    Please Call     Contact    [phone]

## 2020-10-20 NOTE — TELEPHONE ENCOUNTER
----- Message from Yossi Viera sent at 10/20/2020  9:53 AM CDT -----  Contact: Mario Alberto obrien [facility] @ [phone] ext [phone]  Caller requesting a return phone call regarding clarification on the medication appeal ( doxepin ) when returning call reference this # [id number]

## 2020-10-26 ENCOUNTER — OUTPATIENT CASE MANAGEMENT (OUTPATIENT)
Dept: NEUROLOGY | Facility: CLINIC | Age: 70
End: 2020-10-26

## 2020-10-30 ENCOUNTER — OUTPATIENT CASE MANAGEMENT (OUTPATIENT)
Dept: NEUROLOGY | Facility: CLINIC | Age: 70
End: 2020-10-30

## 2020-10-30 NOTE — PROGRESS NOTES
"INTERDISCIPLINARY MEMORY ASSESSMENT CLINIC  Social Work Psychosocial Assessment/Care Management Assessment    Referral Information  Name: Blane Kim  MRN: [id number]  : 1950  Age: 70 y.o.  Billing: See below for details as coding/billing has changed     REASON FOR VISIT   Phoned caregiver, wife An. Social work phone visit to assess patient and caregiver needs and make care management plan.     PSYCHOSOCIAL ASSESSMENT       Location (own home, family, facility)     Own home    Resides with (name, relationship)  antwan Nolan    Primary Caregiver (if different from "resides with") name, relationship  Same    Is a caregiver present during day? Overnight?  Yes. Wife leaves pt to go into town for errands   Yes    Marital/relationship status      Financial Status  No concerns noted     Status  NA    Caregiver Concerns  Loses things frequently, obsesses about things, is a "packrat" is possessions are very disorganized which makes it difficult for pt to engage his hobbies     SUPPORTS:     Additional Caregivers/relationship  3 of the 5 adult children (blended fmly) live within 1.5 hours of the home and visit on a regular basis with their kids, which pt enjoyes    Patient engagement (activities, hobbies)  Works in his shop, although he has too many things to be able to stick with tasks    Caregiver's natural supports/self-care  Adult children and the grandchildren visit regularly; wife gets out to town and runs errands on her own, exercises, works in yard, read. 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    Resources currently utilized  None currenlty     MOOD/BEHAVIOR    Per Neuropsychology Note of 10/5/2020:  Collecting items, has too many things to keep track of, sometimes thinks someone like his late mother is in the house       :      Additional input per caregiver:  Loses things frequently, packrat, resists anyone helping him organize his possessions, "            CONCERNS/PROBLEM AREAS IDENTIFIED   Concerns Plan   Advanced Care Planning docs not yet in place Wife plans to proceed with this ASAP. She will likely consult with an    Pt frequently loses things, requests wife assist Provide education, tips   Obsesses Provide education, tips   Hoards, possessions are disorganized, can't manage possessions to be able to engage in hobbies Provide education, tips                                                                                                                RESOURCES/REFERRALS PROVIDED TODAY:  Provided education and tips.    FOLLOW-UP PLAN:   1. Care Management/Social Work: E-mail various written materials/resources

## 2020-11-03 ENCOUNTER — PATIENT OUTREACH (OUTPATIENT)
Dept: OTHER | Facility: OTHER | Age: 70
End: 2020-11-03

## 2020-11-17 NOTE — PROGRESS NOTES
Digital Medicine: Health  Follow-Up    The history is provided by the patient.                 Patient needs assistance troubleshooting: patient reminder needed.      Topics Covered on Call: physical activity and Diet    Additional Follow-up details: Patient reports doing okay. He states that he has been very forgetful lately. He states that he is having a hard time remembering to do a lot of things. He is blessed to have his wife with him to help remind him to do things and be by his side.   He reports forgetting to take his blood pressure readings. Requested minimum of once a week. Encouraged patient set an alarm to help remember.     Patient and wife are going to visit his sister in law for Thanksgiving.             Diet-no change to diet    No change to diet.      Intervention(s): DASH diet/sodium reduction education      Physical Activity-no change to routine  No change to exercise routine.     Medication Adherence-Medication Adherence not addressed.        Substance, Sleep, Stress-No change  stress-not assessed  Details:  Intervention(s):    Sleep-not assessed  Details:  Intervention(s):    Alcohol -not assessed  Details:  Intervention(s):    Tobacco-Not Assessed  Details:  Intervention(s):          Additional monitoring needed.  Continue current diet/physical activity routine.       Addressed patient questions and patient has my contact information if needed prior to next outreach. Patient verbalizes understanding.      Explained the importance of self-monitoring and medication adherence. Encouraged the patient to communicate with their health  for lifestyle modifications to help improve or maintain a healthy lifestyle.                   Topic    Lipid (Cholesterol) Test          Last 5 Patient Entered Readings                                      Current 30 Day Average: 127/81     Recent Readings 10/26/2020 10/20/2020 10/13/2020 10/1/2020 9/26/2020    SBP (mmHg) 134 120 128 126 127    DBP (mmHg)  82 80 60 76 80    Pulse 66 68 72 77 81

## 2020-11-27 ENCOUNTER — OUTPATIENT CASE MANAGEMENT (OUTPATIENT)
Dept: NEUROLOGY | Facility: CLINIC | Age: 70
End: 2020-11-27

## 2020-12-03 ENCOUNTER — PATIENT OUTREACH (OUTPATIENT)
Dept: OTHER | Facility: OTHER | Age: 70
End: 2020-12-03

## 2020-12-03 DIAGNOSIS — I10 ESSENTIAL HYPERTENSION: Primary | ICD-10-CM

## 2020-12-03 NOTE — PROGRESS NOTES
Digital Medicine: Clinician Follow-Up    Called patient to discuss his voicemail about issues with his BP cuff.    The history is provided by the patient.   Follow-up reason(s): addressing patient questions/concerns.     Patient is not experiencing signs/symptoms of hypotension.  Patient is not experiencing signs/symptoms of hypertension.            Last 5 Patient Entered Readings                                      Current 30 Day Average:      Recent Readings 10/26/2020 10/20/2020 10/13/2020 10/1/2020 9/26/2020    SBP (mmHg) 134 120 128 126 127    DBP (mmHg) 82 80 60 76 80    Pulse 66 68 72 77 81                 Depression Screening  Did not address depression screening.    Sleep Apnea Screening    Did not address sleep apnea screening.     Medication Affordability Screening  Did not address medication affordability screening.     Medication Adherence-Medication adherence was assessed.          ASSESSMENT(S)No BP readings to assess if his BP is controlled.  Hypertension Plan  Additional monitoring needed.  Continue current therapy.  Instructed to charge device. If he charges the device and this does not fix the issue, he will call our technical support line.       Addressed patient questions and patient has my contact information if needed prior to next outreach. Patient verbalizes understanding.                 Topic    Lipid (Cholesterol) Test      There are no preventive care reminders to display for this patient.      Hypertension Medications             amLODIPine (NORVASC) 5 MG tablet Take 1 tablet (5 mg total) by mouth once daily.    carvedilol (COREG) 12.5 MG tablet Take 1 tablet (12.5 mg total) by mouth 2 (two) times daily with meals.    hydroCHLOROthiazide (HYDRODIURIL) 25 MG tablet Take 1 tablet (25 mg total) by mouth once daily.

## 2020-12-07 ENCOUNTER — OUTPATIENT CASE MANAGEMENT (OUTPATIENT)
Dept: NEUROLOGY | Facility: CLINIC | Age: 70
End: 2020-12-07

## 2020-12-15 ENCOUNTER — PATIENT OUTREACH (OUTPATIENT)
Dept: OTHER | Facility: OTHER | Age: 70
End: 2020-12-15

## 2020-12-15 NOTE — PROGRESS NOTES
Digital Medicine: Health  Follow-Up    The history is provided by the patient.             Reason for review: Blood pressure not at goal    Patient needs assistance troubleshooting: tech support needed.      Topics Covered on Call: physical activity and Diet    Additional Follow-up details: AVG /91    Patient states that he had spoken with someone with tech support who stated they would call him back but he has yet to hear back from them. Will place crm for further assistance connecting device.     Overall he feels great. He was putting up Leo lights when I called. He is confident in his diet and exercise routine.     Encouraged patient to continue with diet and exercise routine.  No questions or concerns at this time.   Will f/u as scheduled.               Diet-no change to diet    No change to diet.      Intervention(s): DASH diet/sodium reduction education      Physical Activity-no change to routine  No change to exercise routine.     Medication Adherence-Medication adherence was assessed.        Substance, Sleep, Stress-No change  stress-not assessed  Details:  Intervention(s):    Sleep-not assessed  Details:  Intervention(s):    Alcohol -not assessed  Details:  Intervention(s):    Tobacco-Not Assessed  Details:  Intervention(s):          Additional monitoring needed.  Continue current diet/physical activity routine.  Instructed to charge device.  Placed task for clinician. Crm       Addressed patient questions and patient has my contact information if needed prior to next outreach. Patient verbalizes understanding.      Explained the importance of self-monitoring and medication adherence. Encouraged the patient to communicate with their health  for lifestyle modifications to help improve or maintain a healthy lifestyle.                   Topic    Lipid (Cholesterol) Test          Last 5 Patient Entered Readings                                      Current 30 Day Average: 141/91     Recent  Readings 12/4/2020 12/4/2020 10/26/2020 10/20/2020 10/13/2020    SBP (mmHg) 141 144 134 120 128    DBP (mmHg) 88 93 82 80 60    Pulse 83 84 66 68 72

## 2020-12-23 ENCOUNTER — PATIENT MESSAGE (OUTPATIENT)
Dept: NEUROLOGY | Facility: CLINIC | Age: 70
End: 2020-12-23

## 2020-12-24 RX ORDER — DULOXETIN HYDROCHLORIDE 60 MG/1
60 CAPSULE, DELAYED RELEASE ORAL DAILY
Qty: 90 CAPSULE | Refills: 3 | Status: SHIPPED | OUTPATIENT
Start: 2020-12-24 | End: 2021-12-24

## 2020-12-26 ENCOUNTER — NURSE TRIAGE (OUTPATIENT)
Dept: ADMINISTRATIVE | Facility: CLINIC | Age: 70
End: 2020-12-26

## 2020-12-26 ENCOUNTER — PATIENT MESSAGE (OUTPATIENT)
Dept: NEUROLOGY | Facility: CLINIC | Age: 70
End: 2020-12-26
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
## 2020-12-28 ENCOUNTER — TELEPHONE (OUTPATIENT)
Dept: NEUROLOGY | Facility: CLINIC | Age: 70
End: 2020-12-28
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
## 2020-12-30 ENCOUNTER — OUTPATIENT CASE MANAGEMENT (OUTPATIENT)
Dept: NEUROLOGY | Facility: CLINIC | Age: 70
End: 2020-12-30

## 2020-12-30 ENCOUNTER — TELEPHONE (OUTPATIENT)
Dept: NEUROLOGY | Facility: CLINIC | Age: 70
End: 2020-12-30
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
## 2020-12-31 ENCOUNTER — OFFICE VISIT (OUTPATIENT)
Dept: NEUROLOGY | Facility: CLINIC | Age: 70
End: 2020-12-31
Payer: MEDICARE

## 2020-12-31 ENCOUNTER — TELEPHONE (OUTPATIENT)
Dept: NEUROLOGY | Facility: CLINIC | Age: 70
End: 2020-12-31

## 2020-12-31 ENCOUNTER — PATIENT MESSAGE (OUTPATIENT)
Dept: NEUROLOGY | Facility: CLINIC | Age: 70
End: 2020-12-31

## 2020-12-31 DIAGNOSIS — R45.4 DIFFICULTY CONTROLLING ANGER: ICD-10-CM

## 2020-12-31 DIAGNOSIS — F43.23 ADJUSTMENT DISORDER WITH MIXED ANXIETY AND DEPRESSED MOOD: Primary | ICD-10-CM

## 2020-12-31 DIAGNOSIS — R41.3 MEMORY DEFICIT: Primary | ICD-10-CM

## 2020-12-31 PROCEDURE — 99214 OFFICE O/P EST MOD 30 MIN: CPT | Mod: 95,,, | Performed by: PSYCHIATRY & NEUROLOGY

## 2020-12-31 PROCEDURE — 99214 PR OFFICE/OUTPT VISIT, EST, LEVL IV, 30-39 MIN: ICD-10-PCS | Mod: 95,,, | Performed by: PSYCHIATRY & NEUROLOGY
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
## 2021-01-01 ENCOUNTER — PATIENT MESSAGE (OUTPATIENT)
Dept: ADMINISTRATIVE | Facility: OTHER | Age: 71
End: 2021-01-01

## 2021-01-04 ENCOUNTER — OFFICE VISIT (OUTPATIENT)
Dept: NEUROLOGY | Facility: CLINIC | Age: 71
End: 2021-01-04
Payer: MEDICARE

## 2021-01-04 DIAGNOSIS — F02.818 MAJOR NEUROCOGNITIVE DISORDER DUE TO MULTIPLE ETIOLOGIES WITH BEHAVIORAL DISTURBANCE: Primary | ICD-10-CM

## 2021-01-04 DIAGNOSIS — Z78.9 ALCOHOL USE: ICD-10-CM

## 2021-01-04 DIAGNOSIS — F43.23 ADJUSTMENT DISORDER WITH MIXED ANXIETY AND DEPRESSED MOOD: ICD-10-CM

## 2021-01-04 PROCEDURE — 99499 UNLISTED E&M SERVICE: CPT | Mod: 95,,, | Performed by: CLINICAL NEUROPSYCHOLOGIST

## 2021-01-04 PROCEDURE — 99499 NO LOS: ICD-10-PCS | Mod: 95,,, | Performed by: CLINICAL NEUROPSYCHOLOGIST

## 2021-01-04 PROCEDURE — 96121 NUBHVL XM PHY/QHP EA ADDL HR: CPT | Mod: 95,,, | Performed by: CLINICAL NEUROPSYCHOLOGIST

## 2021-01-04 PROCEDURE — 96116 NUBHVL XM PHYS/QHP 1ST HR: CPT | Mod: 95,,, | Performed by: CLINICAL NEUROPSYCHOLOGIST

## 2021-01-04 PROCEDURE — 96116 PR NEUROBEHAVIORAL STATUS EXAM BY PSYCH/PHYS: ICD-10-PCS | Mod: 95,,, | Performed by: CLINICAL NEUROPSYCHOLOGIST

## 2021-01-04 PROCEDURE — 96121 PR NEUROBEHAVIORAL STAT EXAM, EA ADDTL HR: ICD-10-PCS | Mod: 95,,, | Performed by: CLINICAL NEUROPSYCHOLOGIST

## 2021-01-05 ENCOUNTER — PATIENT MESSAGE (OUTPATIENT)
Dept: NEUROLOGY | Facility: CLINIC | Age: 71
End: 2021-01-05

## 2021-01-08 ENCOUNTER — OUTPATIENT CASE MANAGEMENT (OUTPATIENT)
Dept: NEUROLOGY | Facility: CLINIC | Age: 71
End: 2021-01-08

## 2021-01-11 ENCOUNTER — OFFICE VISIT (OUTPATIENT)
Dept: NEUROLOGY | Facility: CLINIC | Age: 71
End: 2021-01-11
Payer: MEDICARE

## 2021-01-11 DIAGNOSIS — F43.23 ADJUSTMENT DISORDER WITH MIXED ANXIETY AND DEPRESSED MOOD: ICD-10-CM

## 2021-01-11 DIAGNOSIS — F02.818 MAJOR NEUROCOGNITIVE DISORDER DUE TO MULTIPLE ETIOLOGIES WITH BEHAVIORAL DISTURBANCE: Primary | ICD-10-CM

## 2021-01-11 PROCEDURE — 99499 UNLISTED E&M SERVICE: CPT | Mod: 95,,, | Performed by: CLINICAL NEUROPSYCHOLOGIST

## 2021-01-11 PROCEDURE — 96116 NUBHVL XM PHYS/QHP 1ST HR: CPT | Mod: 95,,, | Performed by: CLINICAL NEUROPSYCHOLOGIST

## 2021-01-11 PROCEDURE — 99499 NO LOS: ICD-10-PCS | Mod: 95,,, | Performed by: CLINICAL NEUROPSYCHOLOGIST

## 2021-01-11 PROCEDURE — 96116 PR NEUROBEHAVIORAL STATUS EXAM BY PSYCH/PHYS: ICD-10-PCS | Mod: 95,,, | Performed by: CLINICAL NEUROPSYCHOLOGIST

## 2021-01-14 ENCOUNTER — TELEPHONE (OUTPATIENT)
Dept: NEUROLOGY | Facility: CLINIC | Age: 71
End: 2021-01-14

## 2021-01-14 ENCOUNTER — OUTPATIENT CASE MANAGEMENT (OUTPATIENT)
Dept: NEUROLOGY | Facility: CLINIC | Age: 71
End: 2021-01-14

## 2021-01-19 ENCOUNTER — PATIENT MESSAGE (OUTPATIENT)
Dept: NEUROLOGY | Facility: CLINIC | Age: 71
End: 2021-01-19

## 2021-01-21 ENCOUNTER — OUTPATIENT CASE MANAGEMENT (OUTPATIENT)
Dept: NEUROLOGY | Facility: CLINIC | Age: 71
End: 2021-01-21

## 2021-01-25 ENCOUNTER — OUTPATIENT CASE MANAGEMENT (OUTPATIENT)
Dept: NEUROLOGY | Facility: CLINIC | Age: 71
End: 2021-01-25

## 2021-01-25 ENCOUNTER — OFFICE VISIT (OUTPATIENT)
Dept: NEUROLOGY | Facility: CLINIC | Age: 71
End: 2021-01-25
Payer: MEDICARE

## 2021-01-25 ENCOUNTER — PATIENT MESSAGE (OUTPATIENT)
Dept: NEUROLOGY | Facility: CLINIC | Age: 71
End: 2021-01-25

## 2021-01-25 DIAGNOSIS — Z78.9 ALCOHOL USE: ICD-10-CM

## 2021-01-25 DIAGNOSIS — F43.23 ADJUSTMENT DISORDER WITH MIXED ANXIETY AND DEPRESSED MOOD: ICD-10-CM

## 2021-01-25 DIAGNOSIS — F02.818 MAJOR NEUROCOGNITIVE DISORDER DUE TO MULTIPLE ETIOLOGIES WITH BEHAVIORAL DISTURBANCE: Primary | ICD-10-CM

## 2021-01-25 PROCEDURE — 90846 PR FAMILY PSYCHOTHERAPY W/O PT, 50 MIN: ICD-10-PCS | Mod: 95,,, | Performed by: CLINICAL NEUROPSYCHOLOGIST

## 2021-01-25 PROCEDURE — 99499 NO LOS: ICD-10-PCS | Mod: 95,,, | Performed by: CLINICAL NEUROPSYCHOLOGIST

## 2021-01-25 PROCEDURE — 99499 UNLISTED E&M SERVICE: CPT | Mod: 95,,, | Performed by: CLINICAL NEUROPSYCHOLOGIST

## 2021-01-25 PROCEDURE — 90846 FAMILY PSYTX W/O PT 50 MIN: CPT | Mod: 95,,, | Performed by: CLINICAL NEUROPSYCHOLOGIST

## 2021-01-29 ENCOUNTER — OUTPATIENT CASE MANAGEMENT (OUTPATIENT)
Dept: NEUROLOGY | Facility: CLINIC | Age: 71
End: 2021-01-29

## 2021-02-23 ENCOUNTER — OUTPATIENT CASE MANAGEMENT (OUTPATIENT)
Dept: NEUROLOGY | Facility: CLINIC | Age: 71
End: 2021-02-23

## 2021-03-16 ENCOUNTER — OUTPATIENT CASE MANAGEMENT (OUTPATIENT)
Dept: NEUROLOGY | Facility: CLINIC | Age: 71
End: 2021-03-16

## 2021-04-23 ENCOUNTER — OUTPATIENT CASE MANAGEMENT (OUTPATIENT)
Dept: NEUROLOGY | Facility: CLINIC | Age: 71
End: 2021-04-23

## 2021-10-04 ENCOUNTER — PATIENT MESSAGE (OUTPATIENT)
Dept: ADMINISTRATIVE | Facility: HOSPITAL | Age: 71
End: 2021-10-04

## 2022-03-16 ENCOUNTER — PATIENT MESSAGE (OUTPATIENT)
Dept: ADMINISTRATIVE | Facility: HOSPITAL | Age: 72
End: 2022-03-16
Payer: MEDICARE

## 2022-10-27 NOTE — ASSESSMENT & PLAN NOTE
Continue with current treatment plan. No new safety concerns to address.     Spent time discussing the importance of minimizing items in his shed to allow him more autonomy in completing projects.     Discussed that they are ready to talk with our , Aida. Will have her reach out.     The patient and his wife would like to check in right after the holidays. Appointment scheduled for Monday, 1/4/2021 at 10:00 AM.   
room air
